# Patient Record
Sex: MALE | Race: WHITE | NOT HISPANIC OR LATINO | Employment: FULL TIME | URBAN - METROPOLITAN AREA
[De-identification: names, ages, dates, MRNs, and addresses within clinical notes are randomized per-mention and may not be internally consistent; named-entity substitution may affect disease eponyms.]

---

## 2024-02-09 ENCOUNTER — HOSPITAL ENCOUNTER (OUTPATIENT)
Dept: RADIOLOGY | Facility: CLINIC | Age: 64
Discharge: HOME | End: 2024-02-09
Payer: COMMERCIAL

## 2024-02-09 ENCOUNTER — OFFICE VISIT (OUTPATIENT)
Dept: PRIMARY CARE | Facility: CLINIC | Age: 64
End: 2024-02-09
Payer: COMMERCIAL

## 2024-02-09 VITALS
WEIGHT: 261 LBS | HEART RATE: 88 BPM | RESPIRATION RATE: 24 BRPM | SYSTOLIC BLOOD PRESSURE: 142 MMHG | TEMPERATURE: 98.2 F | BODY MASS INDEX: 35.4 KG/M2 | DIASTOLIC BLOOD PRESSURE: 92 MMHG | OXYGEN SATURATION: 95 %

## 2024-02-09 DIAGNOSIS — R09.89 ABNORMAL LUNG SOUNDS: ICD-10-CM

## 2024-02-09 DIAGNOSIS — R05.1 ACUTE COUGH: Primary | ICD-10-CM

## 2024-02-09 DIAGNOSIS — J01.90 ACUTE NON-RECURRENT SINUSITIS, UNSPECIFIED LOCATION: ICD-10-CM

## 2024-02-09 DIAGNOSIS — J00 ACUTE NASOPHARYNGITIS: ICD-10-CM

## 2024-02-09 PROBLEM — I10 HTN (HYPERTENSION): Status: ACTIVE | Noted: 2024-02-09

## 2024-02-09 PROBLEM — J45.909 ASTHMA (HHS-HCC): Status: ACTIVE | Noted: 2024-02-09

## 2024-02-09 PROBLEM — F32.A DEPRESSION: Status: ACTIVE | Noted: 2024-02-09

## 2024-02-09 PROBLEM — N41.9 PROSTATITIS: Status: ACTIVE | Noted: 2024-02-09

## 2024-02-09 PROBLEM — E78.5 HLD (HYPERLIPIDEMIA): Status: ACTIVE | Noted: 2024-02-09

## 2024-02-09 PROBLEM — G89.29 CHRONIC BACK PAIN: Status: ACTIVE | Noted: 2024-02-09

## 2024-02-09 PROBLEM — R35.0 INCREASED URINARY FREQUENCY: Status: ACTIVE | Noted: 2024-02-09

## 2024-02-09 PROBLEM — R10.32 LEFT LOWER QUADRANT ABDOMINAL PAIN: Status: ACTIVE | Noted: 2024-02-09

## 2024-02-09 PROBLEM — K21.9 GERD (GASTROESOPHAGEAL REFLUX DISEASE): Status: ACTIVE | Noted: 2024-02-09

## 2024-02-09 PROBLEM — N40.0 BPH (BENIGN PROSTATIC HYPERPLASIA): Status: ACTIVE | Noted: 2024-02-09

## 2024-02-09 PROBLEM — E27.9 ADRENAL NODULE: Status: ACTIVE | Noted: 2024-02-09

## 2024-02-09 PROBLEM — J30.2 SEASONAL ALLERGIC RHINITIS: Status: ACTIVE | Noted: 2024-02-09

## 2024-02-09 PROBLEM — K64.9 HEMORRHOID: Status: ACTIVE | Noted: 2024-02-09

## 2024-02-09 PROBLEM — M25.529 ARTHRALGIA OF UPPER ARM: Status: ACTIVE | Noted: 2017-07-31

## 2024-02-09 PROBLEM — M54.9 CHRONIC BACK PAIN: Status: ACTIVE | Noted: 2024-02-09

## 2024-02-09 PROBLEM — K59.00 CONSTIPATION: Status: ACTIVE | Noted: 2024-02-09

## 2024-02-09 PROBLEM — E55.9 VITAMIN D DEFICIENCY: Status: ACTIVE | Noted: 2024-02-09

## 2024-02-09 PROBLEM — K57.90 DIVERTICULOSIS: Status: ACTIVE | Noted: 2024-02-09

## 2024-02-09 PROBLEM — H91.90 HEARING LOSS: Status: ACTIVE | Noted: 2024-02-09

## 2024-02-09 PROBLEM — E27.8 ADRENAL NODULE (MULTI): Status: ACTIVE | Noted: 2024-02-09

## 2024-02-09 PROBLEM — I25.10 CAD (CORONARY ARTERY DISEASE): Status: ACTIVE | Noted: 2024-02-09

## 2024-02-09 LAB
POC RAPID INFLUENZA A: NEGATIVE
POC RAPID INFLUENZA B: NEGATIVE
POC SARS-COV-2 AG BINAX: NORMAL

## 2024-02-09 PROCEDURE — 3080F DIAST BP >= 90 MM HG: CPT | Performed by: NURSE PRACTITIONER

## 2024-02-09 PROCEDURE — 87811 SARS-COV-2 COVID19 W/OPTIC: CPT | Performed by: NURSE PRACTITIONER

## 2024-02-09 PROCEDURE — 87635 SARS-COV-2 COVID-19 AMP PRB: CPT

## 2024-02-09 PROCEDURE — 71046 X-RAY EXAM CHEST 2 VIEWS: CPT | Performed by: RADIOLOGY

## 2024-02-09 PROCEDURE — 71046 X-RAY EXAM CHEST 2 VIEWS: CPT

## 2024-02-09 PROCEDURE — 87804 INFLUENZA ASSAY W/OPTIC: CPT | Performed by: NURSE PRACTITIONER

## 2024-02-09 PROCEDURE — 99214 OFFICE O/P EST MOD 30 MIN: CPT | Performed by: NURSE PRACTITIONER

## 2024-02-09 PROCEDURE — 3077F SYST BP >= 140 MM HG: CPT | Performed by: NURSE PRACTITIONER

## 2024-02-09 PROCEDURE — 1036F TOBACCO NON-USER: CPT | Performed by: NURSE PRACTITIONER

## 2024-02-09 RX ORDER — PREDNISONE 50 MG/1
50 TABLET ORAL DAILY
Qty: 5 TABLET | Refills: 0 | Status: SHIPPED | OUTPATIENT
Start: 2024-02-09 | End: 2024-02-16 | Stop reason: ALTCHOICE

## 2024-02-09 RX ORDER — DOXYCYCLINE 100 MG/1
100 CAPSULE ORAL 2 TIMES DAILY
Qty: 14 CAPSULE | Refills: 0 | Status: SHIPPED | OUTPATIENT
Start: 2024-02-09 | End: 2024-02-16 | Stop reason: ALTCHOICE

## 2024-02-09 ASSESSMENT — ENCOUNTER SYMPTOMS
HEADACHES: 1
SHORTNESS OF BREATH: 1
WHEEZING: 1
COUGH: 1

## 2024-02-09 NOTE — PROGRESS NOTES
Subjective   Patient ID: Mayo Edmond is a 63 y.o. male who presents for Cough.  Cough  This is a new problem. The current episode started in the past 7 days. The problem has been unchanged. The problem occurs constantly. The cough is Productive of sputum. Associated symptoms include headaches, nasal congestion, postnasal drip, shortness of breath and wheezing. The symptoms are aggravated by lying down. He has tried OTC cough suppressant (Nyquil) for the symptoms. The treatment provided mild relief. His past medical history is significant for asthma, bronchitis and pneumonia.   Former smoker, quit approx 2008, approx 25 pack years.  Review of Systems   HENT:  Positive for postnasal drip.    Respiratory:  Positive for cough, shortness of breath and wheezing.    Neurological:  Positive for headaches.   Objective   BP (!) 142/92   Pulse 88   Temp 36.8 °C (98.2 °F) (Temporal)   Resp 24   Wt 118 kg (261 lb)   SpO2 95%   BMI 35.40 kg/m²   Physical Exam  Constitutional:       General: He is in acute distress.      Appearance: Normal appearance. He is ill-appearing. He is not toxic-appearing.   HENT:      Right Ear: Tympanic membrane normal.      Left Ear: Tympanic membrane normal.      Nose: Congestion and rhinorrhea present.      Mouth/Throat:      Pharynx: Posterior oropharyngeal erythema present.   Eyes:      Conjunctiva/sclera: Conjunctivae normal.   Cardiovascular:      Rate and Rhythm: Normal rate and regular rhythm.      Heart sounds: No murmur heard.  Pulmonary:      Effort: Pulmonary effort is normal.      Breath sounds: Wheezing (diffuse inspiratory and expiratory wheezes) and rales present.   Skin:     General: Skin is warm and dry.   Neurological:      Mental Status: He is alert and oriented to person, place, and time.     Assessment/Plan   1. Acute cough  POCT Influenza A/B manually resulted    POCT BinaxNOW Covid-19 Ag Card manually resulted      2. Abnormal lung sounds  predniSONE (Deltasone) 50 mg  tablet    doxycycline (Vibramycin) 100 mg capsule    XR chest 2 views      3. Acute non-recurrent sinusitis, unspecified location        Rapid COVID and flu negative. Backup COVID sent.  Concern for possible pneumonia.  Increase oral fluids/water, at least eight 8-oz glasses/day.  Get plenty of rest.  Cepacol lozenges and/or Chloraseptic spray PRN for sore throat. Salt water gargle or broth for comfort.  Alternate Tylenol/Ibuprofen PRN for body aches and/or fever  Saline nasal spray PRN for rhinitis.  Guaifenessin/Guaifenissin DM PRN for cough  Flonase nasal spray.

## 2024-02-10 LAB — SARS-COV-2 RNA RESP QL NAA+PROBE: NOT DETECTED

## 2024-02-10 NOTE — RESULT ENCOUNTER NOTE
Chest x-ray and COVID-19 testing were both negative. Pt should continue the doxycycline and medrol dose pack until they are complete. F/U with Dr. Chong if symptoms not improved after treatment complete.

## 2024-02-16 ENCOUNTER — OFFICE VISIT (OUTPATIENT)
Dept: PRIMARY CARE | Facility: CLINIC | Age: 64
End: 2024-02-16
Payer: COMMERCIAL

## 2024-02-16 VITALS
TEMPERATURE: 98 F | WEIGHT: 262 LBS | OXYGEN SATURATION: 95 % | HEART RATE: 85 BPM | RESPIRATION RATE: 18 BRPM | DIASTOLIC BLOOD PRESSURE: 80 MMHG | SYSTOLIC BLOOD PRESSURE: 144 MMHG | BODY MASS INDEX: 35.53 KG/M2

## 2024-02-16 DIAGNOSIS — J01.90 ACUTE NON-RECURRENT SINUSITIS, UNSPECIFIED LOCATION: Primary | ICD-10-CM

## 2024-02-16 PROCEDURE — 3077F SYST BP >= 140 MM HG: CPT | Performed by: NURSE PRACTITIONER

## 2024-02-16 PROCEDURE — 3079F DIAST BP 80-89 MM HG: CPT | Performed by: NURSE PRACTITIONER

## 2024-02-16 PROCEDURE — 99214 OFFICE O/P EST MOD 30 MIN: CPT | Performed by: NURSE PRACTITIONER

## 2024-02-16 PROCEDURE — 1036F TOBACCO NON-USER: CPT | Performed by: NURSE PRACTITIONER

## 2024-02-16 RX ORDER — DOXYCYCLINE 100 MG/1
100 CAPSULE ORAL 2 TIMES DAILY
Qty: 14 CAPSULE | Refills: 0 | Status: SHIPPED | OUTPATIENT
Start: 2024-02-16 | End: 2024-02-23

## 2024-02-16 RX ORDER — TRIAMCINOLONE ACETONIDE 55 UG/1
1 SPRAY, METERED NASAL 2 TIMES DAILY
Qty: 16.5 G | Refills: 0 | Status: SHIPPED | OUTPATIENT
Start: 2024-02-16 | End: 2024-02-23

## 2024-02-16 ASSESSMENT — ENCOUNTER SYMPTOMS
COUGH: 1
SINUS PRESSURE: 1
SHORTNESS OF BREATH: 1

## 2024-02-16 NOTE — PROGRESS NOTES
Subjective   Patient ID: Mayo Edmond is a 63 y.o. male who presents for Sinusitis. He was treated with a 7-day course of doxycycline and 5-day burst of prednisone 50 mg. He states his cough is bit looser, but he still feels terrible.   Sinusitis  Episode onset: 3 weeks. Associated symptoms include coughing, shortness of breath and sinus pressure. Treatments tried: Doxycyline, prednisone.    Review of Systems   HENT:  Positive for sinus pressure.    Respiratory:  Positive for cough and shortness of breath.    Objective   /80   Pulse 85   Temp 36.7 °C (98 °F)   Resp 18   Wt 119 kg (262 lb)   SpO2 95%   BMI 35.53 kg/m²   Physical Exam  Constitutional:       General: He is not in acute distress.     Appearance: Normal appearance. He is ill-appearing. He is not toxic-appearing or diaphoretic.   HENT:      Right Ear: Tympanic membrane normal.      Left Ear: Tympanic membrane normal.      Nose: Congestion present. No rhinorrhea.      Mouth/Throat:      Mouth: Mucous membranes are moist.      Pharynx: Posterior oropharyngeal erythema present.   Eyes:      Conjunctiva/sclera: Conjunctivae normal.   Cardiovascular:      Rate and Rhythm: Normal rate and regular rhythm.      Heart sounds: No murmur heard.  Pulmonary:      Effort: Pulmonary effort is normal.      Breath sounds: Wheezing (few posterior expiratory wheezes. Much improved.) present.   Skin:     General: Skin is warm and dry.   Neurological:      General: No focal deficit present.      Mental Status: He is alert and oriented to person, place, and time.   Psychiatric:         Mood and Affect: Mood normal.     Assessment/Plan   1. Acute non-recurrent sinusitis, unspecified location  doxycycline (Vibramycin) 100 mg capsule    triamcinolone (Nasacort) 55 mcg nasal inhaler    lungs clearing, but still with significant sinusitis and moist cough. Will continue doxy for another week and add nasocort nasal spray.        Increase oral fluids/water, at least  eight 8-oz glasses/day.  Get plenty of rest.  Cepacol lozenges and/or Chloraseptic spray PRN for sore throat. Salt water gargle or broth for comfort.  Alternate Tylenol/Ibuprofen PRN for body aches and/or fever  Saline nasal spray PRN for rhinitis.  Guaifenessin/Guaifenissin DM PRN for cough  Flonase nasal spray.    Follow-up if not improving over the next week.

## 2024-12-06 ENCOUNTER — OFFICE VISIT (OUTPATIENT)
Dept: PRIMARY CARE | Facility: CLINIC | Age: 64
End: 2024-12-06
Payer: COMMERCIAL

## 2024-12-06 VITALS
WEIGHT: 250 LBS | SYSTOLIC BLOOD PRESSURE: 122 MMHG | OXYGEN SATURATION: 96 % | BODY MASS INDEX: 33.91 KG/M2 | HEART RATE: 85 BPM | DIASTOLIC BLOOD PRESSURE: 82 MMHG | TEMPERATURE: 98 F | RESPIRATION RATE: 20 BRPM

## 2024-12-06 DIAGNOSIS — H57.89 IRRITATION OF BOTH EYES: Primary | ICD-10-CM

## 2024-12-06 PROCEDURE — 99213 OFFICE O/P EST LOW 20 MIN: CPT | Performed by: NURSE PRACTITIONER

## 2024-12-06 PROCEDURE — 1036F TOBACCO NON-USER: CPT | Performed by: NURSE PRACTITIONER

## 2024-12-06 PROCEDURE — 3074F SYST BP LT 130 MM HG: CPT | Performed by: NURSE PRACTITIONER

## 2024-12-06 PROCEDURE — 3079F DIAST BP 80-89 MM HG: CPT | Performed by: NURSE PRACTITIONER

## 2024-12-06 RX ORDER — ERYTHROMYCIN 5 MG/G
OINTMENT OPHTHALMIC 4 TIMES DAILY
Qty: 3.5 G | Refills: 0 | Status: SHIPPED | OUTPATIENT
Start: 2024-12-06 | End: 2024-12-13

## 2024-12-06 ASSESSMENT — ENCOUNTER SYMPTOMS
SLEEP DISTURBANCE: 0
COUGH: 0
EYE PAIN: 0
NAUSEA: 0
FEVER: 0
CHILLS: 0
PHOTOPHOBIA: 1
DIARRHEA: 0
VOMITING: 0
BLURRED VISION: 1
ACTIVITY CHANGE: 0
EYE ITCHING: 0
EYE REDNESS: 1
APPETITE CHANGE: 0
EYE DISCHARGE: 0

## 2024-12-06 NOTE — PROGRESS NOTES
Subjective   Patient ID: Mayo Edmond is a 64 y.o. male who presents for Eye Problem (Red eyes watery/Sunlight is bothering him/Looking at phone for a few minutes bothers him/).    Bilateral eyes discomfort xseveral weeks  Watery eyes  Eyes get blurry; worse when looking at this phone  Light bothers some of the time  No drainage    Not up to date on vision      Eye Problem   Both eyes are affected. This is a chronic problem. Episode onset: 2-3 weeks getting worse. The problem occurs constantly. The problem has been gradually worsening. The pain is mild. Associated symptoms include blurred vision, eye redness and photophobia. Pertinent negatives include no eye discharge, fever, itching, nausea or vomiting. He has tried eye drops for the symptoms. The treatment provided no relief.        Review of Systems   Constitutional:  Negative for activity change, appetite change, chills and fever.   HENT:  Negative for congestion.    Eyes:  Positive for blurred vision, photophobia and redness. Negative for pain, discharge and itching.   Respiratory:  Negative for cough.    Gastrointestinal:  Negative for diarrhea, nausea and vomiting.   Psychiatric/Behavioral:  Negative for sleep disturbance.        Objective   /82   Pulse 85   Temp 36.7 °C (98 °F)   Resp 20   Wt 113 kg (250 lb)   SpO2 96%   BMI 33.91 kg/m²     Physical Exam  Vitals reviewed.   Constitutional:       General: He is not in acute distress.     Appearance: Normal appearance. He is not ill-appearing or toxic-appearing.   HENT:      Right Ear: Tympanic membrane, ear canal and external ear normal.      Left Ear: Tympanic membrane, ear canal and external ear normal.      Nose: Nose normal.      Mouth/Throat:      Mouth: Mucous membranes are moist.   Eyes:      General:         Right eye: No discharge.         Left eye: No discharge.      Extraocular Movements: Extraocular movements intact.      Conjunctiva/sclera:      Right eye: Right conjunctiva is  injected. No exudate.     Left eye: Left conjunctiva is injected. No exudate.     Pupils: Pupils are equal, round, and reactive to light.        Comments: R eye has swollen/inflamed caruncle   Cardiovascular:      Rate and Rhythm: Normal rate and regular rhythm.      Pulses: Normal pulses.      Heart sounds: Normal heart sounds.   Pulmonary:      Effort: Pulmonary effort is normal.      Breath sounds: Normal breath sounds.   Musculoskeletal:      Cervical back: Normal range of motion and neck supple.   Skin:     General: Skin is warm and dry.      Capillary Refill: Capillary refill takes less than 2 seconds.   Neurological:      General: No focal deficit present.      Mental Status: He is alert and oriented to person, place, and time.   Psychiatric:         Mood and Affect: Mood normal.         Behavior: Behavior normal.         Assessment/Plan   Diagnoses and all orders for this visit:  Irritation of both eyes  -     erythromycin (Romycin) 5 mg/gram (0.5 %) ophthalmic ointment; Apply to both eyes 4 times a day for 7 days. Apply Amount per Dose: 0.5 inch (~1 cm) per dose.    Eye ointment given for inflamed caruncle. Use as directed  Follow up with [facility] as scheduled. Will assist in scheduling  ER for any worsening of symptoms

## 2025-01-09 ENCOUNTER — HOSPITAL ENCOUNTER (OUTPATIENT)
Dept: RADIOLOGY | Facility: CLINIC | Age: 65
Discharge: HOME | End: 2025-01-09
Payer: COMMERCIAL

## 2025-01-09 ENCOUNTER — OFFICE VISIT (OUTPATIENT)
Dept: PRIMARY CARE | Facility: CLINIC | Age: 65
End: 2025-01-09
Payer: COMMERCIAL

## 2025-01-09 VITALS
HEART RATE: 79 BPM | BODY MASS INDEX: 33.63 KG/M2 | RESPIRATION RATE: 20 BRPM | WEIGHT: 248 LBS | OXYGEN SATURATION: 99 % | DIASTOLIC BLOOD PRESSURE: 86 MMHG | TEMPERATURE: 97.5 F | SYSTOLIC BLOOD PRESSURE: 130 MMHG

## 2025-01-09 DIAGNOSIS — R10.9 FLANK PAIN: ICD-10-CM

## 2025-01-09 DIAGNOSIS — R10.32 LEFT LOWER QUADRANT ABDOMINAL PAIN: ICD-10-CM

## 2025-01-09 DIAGNOSIS — R10.32 LEFT LOWER QUADRANT ABDOMINAL PAIN: Primary | ICD-10-CM

## 2025-01-09 PROBLEM — I10 HYPERTENSION: Status: ACTIVE | Noted: 2022-11-30

## 2025-01-09 PROBLEM — K57.90 DIVERTICULAR DISEASE: Status: ACTIVE | Noted: 2022-09-08

## 2025-01-09 LAB
POC APPEARANCE, URINE: CLEAR
POC BILIRUBIN, URINE: NEGATIVE
POC BLOOD, URINE: NEGATIVE
POC COLOR, URINE: ABNORMAL
POC GLUCOSE, URINE: NEGATIVE MG/DL
POC KETONES, URINE: NEGATIVE MG/DL
POC LEUKOCYTES, URINE: NEGATIVE
POC NITRITE,URINE: NEGATIVE
POC PH, URINE: 6.5 PH
POC PROTEIN, URINE: NEGATIVE MG/DL
POC SPECIFIC GRAVITY, URINE: 1.01
POC UROBILINOGEN, URINE: 0.2 EU/DL

## 2025-01-09 PROCEDURE — 74176 CT ABD & PELVIS W/O CONTRAST: CPT | Performed by: RADIOLOGY

## 2025-01-09 PROCEDURE — 3079F DIAST BP 80-89 MM HG: CPT | Performed by: FAMILY MEDICINE

## 2025-01-09 PROCEDURE — 81002 URINALYSIS NONAUTO W/O SCOPE: CPT | Performed by: FAMILY MEDICINE

## 2025-01-09 PROCEDURE — 74176 CT ABD & PELVIS W/O CONTRAST: CPT

## 2025-01-09 PROCEDURE — 1036F TOBACCO NON-USER: CPT | Performed by: FAMILY MEDICINE

## 2025-01-09 PROCEDURE — 3075F SYST BP GE 130 - 139MM HG: CPT | Performed by: FAMILY MEDICINE

## 2025-01-09 PROCEDURE — 99214 OFFICE O/P EST MOD 30 MIN: CPT | Performed by: FAMILY MEDICINE

## 2025-01-09 RX ORDER — METRONIDAZOLE 500 MG/1
500 TABLET ORAL 3 TIMES DAILY
Qty: 30 TABLET | Refills: 0 | Status: SHIPPED | OUTPATIENT
Start: 2025-01-09 | End: 2025-01-19

## 2025-01-09 RX ORDER — CIPROFLOXACIN 500 MG/1
500 TABLET ORAL 2 TIMES DAILY
Qty: 20 TABLET | Refills: 0 | Status: SHIPPED | OUTPATIENT
Start: 2025-01-09 | End: 2025-01-19

## 2025-01-09 ASSESSMENT — ENCOUNTER SYMPTOMS
ABDOMINAL PAIN: 1
RHINORRHEA: 0
VOMITING: 0
HEMATURIA: 0
DIFFICULTY URINATING: 0
BLOOD IN STOOL: 1
FEVER: 0
SORE THROAT: 0
WHEEZING: 0
FATIGUE: 1
COUGH: 0
SHORTNESS OF BREATH: 0
DIARRHEA: 1
DYSURIA: 0
CONSTIPATION: 1
WEIGHT LOSS: 1
NAUSEA: 0

## 2025-01-09 NOTE — PROGRESS NOTES
Subjective   Patient ID: Mayo Edmond is a 64 y.o. male who presents for Abdominal Pain (Back pain/SX: started [place]/HX: diverticulitis/).    Abdominal Pain  This is a new problem. Episode onset: few weeks. The onset quality is gradual. The problem occurs constantly. The problem has been gradually worsening. The pain is located in the LLQ and left flank. The pain is at a severity of 10/10 (varies). The quality of the pain is sharp and cramping. Associated symptoms include constipation, diarrhea and weight loss. Pertinent negatives include no dysuria, fever, hematuria, nausea or vomiting. Associated symptoms comments: Rectal bleeding-has Hemorrhoids    . Treatments tried: soup diet. The treatment provided no relief.        Review of Systems   Constitutional:  Positive for fatigue and weight loss. Negative for fever.        Symptoms x few weeks,    HENT:  Negative for congestion, ear pain, rhinorrhea and sore throat.    Respiratory:  Negative for cough, shortness of breath and wheezing.    Cardiovascular:  Negative for chest pain.   Gastrointestinal:  Positive for abdominal pain, blood in stool, constipation and diarrhea. Negative for nausea and vomiting.        Pain is LLQ and left flank pain  Pain is sharp , intermittent, worse with BM, improved with motrin  Has had uti, prostatitis, hx of diverticulitis, no hx of kidney stones.   Genitourinary:  Negative for difficulty urinating, dysuria, hematuria, scrotal swelling and testicular pain.        Pt with hx of prostatitis       Objective   /86   Pulse 79   Temp 36.4 °C (97.5 °F)   Resp 20   Wt 112 kg (248 lb)   SpO2 99%   BMI 33.63 kg/m²     Physical Exam  Vitals and nursing note reviewed.   Constitutional:       General: He is not in acute distress.     Appearance: Normal appearance.   HENT:      Head: Normocephalic and atraumatic.   Cardiovascular:      Rate and Rhythm: Normal rate and regular rhythm.      Heart sounds: Normal heart sounds.    Pulmonary:      Effort: Pulmonary effort is normal.      Breath sounds: Normal breath sounds.   Abdominal:      General: Bowel sounds are normal.      Palpations: Abdomen is soft. There is no mass.      Tenderness: There is abdominal tenderness. There is no right CVA tenderness, left CVA tenderness, guarding or rebound.      Comments: LLQ pain to palp, no cvat to palp  No peritoneal signs   Neurological:      Mental Status: He is alert.         Assessment/Plan   Problem List Items Addressed This Visit             ICD-10-CM    Left lower quadrant abdominal pain - Primary R10.32     Pt with likely diverticulitis  Take atbx as directed  Get ct abd/pelvis stat today  Clear fld diet, rest ,   Will call pt with any abn result and make further recc's as needed based on results  F/up after testing and tx, may need to f/up with GI  Go to ER if worsening symptoms         Relevant Medications    ciprofloxacin (Cipro) 500 mg tablet    metroNIDAZOLE (Flagyl) 500 mg tablet    Other Relevant Orders    CT abdomen pelvis wo IV contrast    Flank pain R10.9     Will check ct abd/pelvis  Pt will be started on atbx  Will call pt if any abn results         Relevant Orders    POCT UA (nonautomated) manually resulted (Completed)    CT abdomen pelvis wo IV contrast

## 2025-01-09 NOTE — ASSESSMENT & PLAN NOTE
Pt with likely diverticulitis  Take atbx as directed  Get ct abd/pelvis stat today  Clear fld diet, rest ,   Will call pt with any abn result and make further recc's as needed based on results  F/up after testing and tx, may need to f/up with GI  Go to ER if worsening symptoms

## 2025-01-10 ENCOUNTER — TELEPHONE (OUTPATIENT)
Dept: PRIMARY CARE | Facility: CLINIC | Age: 65
End: 2025-01-10
Payer: COMMERCIAL

## 2025-01-10 NOTE — TELEPHONE ENCOUNTER
----- Message from Serenity Chong sent at 1/9/2025  3:18 PM EST -----  Call pt ,  stat ct abd/pelvis shows early diverticulitis without abscess or perf  Pt to take atbx as rx'd. Will need f/up appt with me in 2 wk  He will need to f/up with GI also. We can arrange that at f/up appt  Small adrenal adenoma redemonstrated on this exam, patchy fatty liver, small not incarcerated umbilical hernia containing intra abd fat, may need surg referral

## 2025-01-16 ENCOUNTER — HOSPITAL ENCOUNTER (EMERGENCY)
Facility: HOSPITAL | Age: 65
Discharge: HOME | End: 2025-01-16
Payer: COMMERCIAL

## 2025-01-16 ENCOUNTER — APPOINTMENT (OUTPATIENT)
Dept: RADIOLOGY | Facility: HOSPITAL | Age: 65
End: 2025-01-16
Payer: COMMERCIAL

## 2025-01-16 VITALS
HEIGHT: 72 IN | HEART RATE: 74 BPM | SYSTOLIC BLOOD PRESSURE: 121 MMHG | BODY MASS INDEX: 33.59 KG/M2 | OXYGEN SATURATION: 97 % | DIASTOLIC BLOOD PRESSURE: 78 MMHG | TEMPERATURE: 97.9 F | WEIGHT: 248 LBS | RESPIRATION RATE: 17 BRPM

## 2025-01-16 DIAGNOSIS — R10.32 LEFT LOWER QUADRANT ABDOMINAL PAIN: ICD-10-CM

## 2025-01-16 DIAGNOSIS — K57.92 DIVERTICULITIS: Primary | ICD-10-CM

## 2025-01-16 LAB
ALBUMIN SERPL BCP-MCNC: 4.3 G/DL (ref 3.4–5)
ALP SERPL-CCNC: 39 U/L (ref 33–136)
ALT SERPL W P-5'-P-CCNC: 45 U/L (ref 10–52)
ANION GAP SERPL CALC-SCNC: 17 MMOL/L (ref 10–20)
APPEARANCE UR: CLEAR
AST SERPL W P-5'-P-CCNC: 34 U/L (ref 9–39)
BASOPHILS # BLD AUTO: 0.06 X10*3/UL (ref 0–0.1)
BASOPHILS NFR BLD AUTO: 1 %
BILIRUB SERPL-MCNC: 0.6 MG/DL (ref 0–1.2)
BILIRUB UR STRIP.AUTO-MCNC: NEGATIVE MG/DL
BUN SERPL-MCNC: 10 MG/DL (ref 6–23)
CALCIUM SERPL-MCNC: 9.5 MG/DL (ref 8.6–10.3)
CHLORIDE SERPL-SCNC: 100 MMOL/L (ref 98–107)
CO2 SERPL-SCNC: 22 MMOL/L (ref 21–32)
COLOR UR: YELLOW
CREAT SERPL-MCNC: 0.84 MG/DL (ref 0.5–1.3)
EGFRCR SERPLBLD CKD-EPI 2021: >90 ML/MIN/1.73M*2
EOSINOPHIL # BLD AUTO: 0.08 X10*3/UL (ref 0–0.7)
EOSINOPHIL NFR BLD AUTO: 1.3 %
ERYTHROCYTE [DISTWIDTH] IN BLOOD BY AUTOMATED COUNT: 12 % (ref 11.5–14.5)
GLUCOSE SERPL-MCNC: 75 MG/DL (ref 74–99)
GLUCOSE UR STRIP.AUTO-MCNC: NORMAL MG/DL
HCT VFR BLD AUTO: 46.2 % (ref 41–52)
HGB BLD-MCNC: 15.4 G/DL (ref 13.5–17.5)
HOLD SPECIMEN: NORMAL
HYALINE CASTS #/AREA URNS AUTO: ABNORMAL /LPF
IMM GRANULOCYTES # BLD AUTO: 0.02 X10*3/UL (ref 0–0.7)
IMM GRANULOCYTES NFR BLD AUTO: 0.3 % (ref 0–0.9)
KETONES UR STRIP.AUTO-MCNC: ABNORMAL MG/DL
LACTATE SERPL-SCNC: 1 MMOL/L (ref 0.4–2)
LEUKOCYTE ESTERASE UR QL STRIP.AUTO: ABNORMAL
LIPASE SERPL-CCNC: 28 U/L (ref 9–82)
LYMPHOCYTES # BLD AUTO: 0.99 X10*3/UL (ref 1.2–4.8)
LYMPHOCYTES NFR BLD AUTO: 15.8 %
MCH RBC QN AUTO: 29.3 PG (ref 26–34)
MCHC RBC AUTO-ENTMCNC: 33.3 G/DL (ref 32–36)
MCV RBC AUTO: 88 FL (ref 80–100)
MONOCYTES # BLD AUTO: 0.59 X10*3/UL (ref 0.1–1)
MONOCYTES NFR BLD AUTO: 9.4 %
MUCOUS THREADS #/AREA URNS AUTO: ABNORMAL /LPF
NEUTROPHILS # BLD AUTO: 4.52 X10*3/UL (ref 1.2–7.7)
NEUTROPHILS NFR BLD AUTO: 72.2 %
NITRITE UR QL STRIP.AUTO: NEGATIVE
NRBC BLD-RTO: 0 /100 WBCS (ref 0–0)
PH UR STRIP.AUTO: 5 [PH]
PLATELET # BLD AUTO: 261 X10*3/UL (ref 150–450)
POTASSIUM SERPL-SCNC: 4 MMOL/L (ref 3.5–5.3)
PROT SERPL-MCNC: 7.2 G/DL (ref 6.4–8.2)
PROT UR STRIP.AUTO-MCNC: NEGATIVE MG/DL
RBC # BLD AUTO: 5.26 X10*6/UL (ref 4.5–5.9)
RBC # UR STRIP.AUTO: NEGATIVE /UL
RBC #/AREA URNS AUTO: ABNORMAL /HPF
SODIUM SERPL-SCNC: 135 MMOL/L (ref 136–145)
SP GR UR STRIP.AUTO: 1.02
UROBILINOGEN UR STRIP.AUTO-MCNC: NORMAL MG/DL
WBC # BLD AUTO: 6.3 X10*3/UL (ref 4.4–11.3)
WBC #/AREA URNS AUTO: ABNORMAL /HPF

## 2025-01-16 PROCEDURE — 96374 THER/PROPH/DIAG INJ IV PUSH: CPT | Mod: 59

## 2025-01-16 PROCEDURE — 2500000004 HC RX 250 GENERAL PHARMACY W/ HCPCS (ALT 636 FOR OP/ED): Performed by: NURSE PRACTITIONER

## 2025-01-16 PROCEDURE — 81001 URINALYSIS AUTO W/SCOPE: CPT | Performed by: NURSE PRACTITIONER

## 2025-01-16 PROCEDURE — 74177 CT ABD & PELVIS W/CONTRAST: CPT | Mod: FOREIGN READ | Performed by: RADIOLOGY

## 2025-01-16 PROCEDURE — 96361 HYDRATE IV INFUSION ADD-ON: CPT

## 2025-01-16 PROCEDURE — 83605 ASSAY OF LACTIC ACID: CPT | Performed by: NURSE PRACTITIONER

## 2025-01-16 PROCEDURE — 96375 TX/PRO/DX INJ NEW DRUG ADDON: CPT

## 2025-01-16 PROCEDURE — 80053 COMPREHEN METABOLIC PANEL: CPT | Performed by: NURSE PRACTITIONER

## 2025-01-16 PROCEDURE — 99285 EMERGENCY DEPT VISIT HI MDM: CPT | Mod: 25

## 2025-01-16 PROCEDURE — 99285 EMERGENCY DEPT VISIT HI MDM: CPT | Performed by: NURSE PRACTITIONER

## 2025-01-16 PROCEDURE — 74177 CT ABD & PELVIS W/CONTRAST: CPT

## 2025-01-16 PROCEDURE — 87086 URINE CULTURE/COLONY COUNT: CPT | Mod: STJLAB | Performed by: NURSE PRACTITIONER

## 2025-01-16 PROCEDURE — 36415 COLL VENOUS BLD VENIPUNCTURE: CPT | Performed by: NURSE PRACTITIONER

## 2025-01-16 PROCEDURE — 83690 ASSAY OF LIPASE: CPT | Performed by: NURSE PRACTITIONER

## 2025-01-16 PROCEDURE — 85025 COMPLETE CBC W/AUTO DIFF WBC: CPT | Performed by: NURSE PRACTITIONER

## 2025-01-16 PROCEDURE — 2550000001 HC RX 255 CONTRASTS: Performed by: NURSE PRACTITIONER

## 2025-01-16 RX ORDER — KETOROLAC TROMETHAMINE 15 MG/ML
15 INJECTION, SOLUTION INTRAMUSCULAR; INTRAVENOUS ONCE
Status: COMPLETED | OUTPATIENT
Start: 2025-01-16 | End: 2025-01-16

## 2025-01-16 RX ORDER — DICYCLOMINE HYDROCHLORIDE 20 MG/1
20 TABLET ORAL 3 TIMES DAILY PRN
Qty: 120 TABLET | Refills: 0 | Status: SHIPPED | OUTPATIENT
Start: 2025-01-16 | End: 2025-01-22 | Stop reason: ALTCHOICE

## 2025-01-16 RX ORDER — MORPHINE SULFATE 4 MG/ML
4 INJECTION, SOLUTION INTRAMUSCULAR; INTRAVENOUS ONCE
Status: COMPLETED | OUTPATIENT
Start: 2025-01-16 | End: 2025-01-16

## 2025-01-16 RX ORDER — NAPROXEN 500 MG/1
500 TABLET ORAL 2 TIMES DAILY PRN
Qty: 30 TABLET | Refills: 0 | Status: SHIPPED | OUTPATIENT
Start: 2025-01-16 | End: 2025-01-22 | Stop reason: ALTCHOICE

## 2025-01-16 RX ORDER — SODIUM CHLORIDE 9 MG/ML
125 INJECTION, SOLUTION INTRAVENOUS CONTINUOUS
Status: DISCONTINUED | OUTPATIENT
Start: 2025-01-16 | End: 2025-01-16 | Stop reason: HOSPADM

## 2025-01-16 RX ORDER — ONDANSETRON HYDROCHLORIDE 2 MG/ML
4 INJECTION, SOLUTION INTRAVENOUS ONCE
Status: COMPLETED | OUTPATIENT
Start: 2025-01-16 | End: 2025-01-16

## 2025-01-16 RX ORDER — ONDANSETRON 4 MG/1
4 TABLET, ORALLY DISINTEGRATING ORAL EVERY 8 HOURS PRN
Qty: 15 TABLET | Refills: 0 | Status: SHIPPED | OUTPATIENT
Start: 2025-01-16 | End: 2025-01-22 | Stop reason: ALTCHOICE

## 2025-01-16 RX ADMIN — ONDANSETRON 4 MG: 2 INJECTION INTRAMUSCULAR; INTRAVENOUS at 11:19

## 2025-01-16 RX ADMIN — MORPHINE SULFATE 4 MG: 4 INJECTION, SOLUTION INTRAMUSCULAR; INTRAVENOUS at 11:15

## 2025-01-16 RX ADMIN — KETOROLAC TROMETHAMINE 15 MG: 15 INJECTION, SOLUTION INTRAMUSCULAR; INTRAVENOUS at 12:49

## 2025-01-16 RX ADMIN — IOHEXOL 90 ML: 350 INJECTION, SOLUTION INTRAVENOUS at 12:30

## 2025-01-16 RX ADMIN — SODIUM CHLORIDE 125 ML/HR: 9 INJECTION, SOLUTION INTRAVENOUS at 11:19

## 2025-01-16 ASSESSMENT — COLUMBIA-SUICIDE SEVERITY RATING SCALE - C-SSRS
1. IN THE PAST MONTH, HAVE YOU WISHED YOU WERE DEAD OR WISHED YOU COULD GO TO SLEEP AND NOT WAKE UP?: NO
2. HAVE YOU ACTUALLY HAD ANY THOUGHTS OF KILLING YOURSELF?: NO
6. HAVE YOU EVER DONE ANYTHING, STARTED TO DO ANYTHING, OR PREPARED TO DO ANYTHING TO END YOUR LIFE?: NO

## 2025-01-16 ASSESSMENT — PAIN SCALES - GENERAL
PAINLEVEL_OUTOF10: 10 - WORST POSSIBLE PAIN
PAINLEVEL_OUTOF10: 4
PAINLEVEL_OUTOF10: 8
PAINLEVEL_OUTOF10: 1
PAINLEVEL_OUTOF10: 1

## 2025-01-16 ASSESSMENT — PAIN DESCRIPTION - FREQUENCY: FREQUENCY: CONSTANT/CONTINUOUS

## 2025-01-16 ASSESSMENT — PAIN DESCRIPTION - ORIENTATION
ORIENTATION: LOWER;RIGHT;LEFT
ORIENTATION: LOWER;MID

## 2025-01-16 ASSESSMENT — LIFESTYLE VARIABLES
HAVE PEOPLE ANNOYED YOU BY CRITICIZING YOUR DRINKING: NO
TOTAL SCORE: 0
EVER FELT BAD OR GUILTY ABOUT YOUR DRINKING: NO
EVER HAD A DRINK FIRST THING IN THE MORNING TO STEADY YOUR NERVES TO GET RID OF A HANGOVER: NO
HAVE YOU EVER FELT YOU SHOULD CUT DOWN ON YOUR DRINKING: NO

## 2025-01-16 ASSESSMENT — PAIN DESCRIPTION - LOCATION
LOCATION: ABDOMEN

## 2025-01-16 ASSESSMENT — PAIN DESCRIPTION - PAIN TYPE: TYPE: ACUTE PAIN

## 2025-01-16 ASSESSMENT — PAIN DESCRIPTION - PROGRESSION: CLINICAL_PROGRESSION: NOT CHANGED

## 2025-01-16 ASSESSMENT — PAIN - FUNCTIONAL ASSESSMENT
PAIN_FUNCTIONAL_ASSESSMENT: 0-10
PAIN_FUNCTIONAL_ASSESSMENT: 0-10

## 2025-01-16 NOTE — ED PROVIDER NOTES
"Emergency Department Encounter  [facility] EMERGENCY MEDICINE    Patient: Mayo Edmond  MRN: [id number]  : 1960  Date of Evaluation: 2025  ED Provider: MORENO Arita      Chief Complaint       Chief Complaint   Patient presents with    Abdominal Pain     Patient states he has an \"infected diverticulitis\" and I have a \"prostate infection, and I'm on ATB for that, but I'm not getting better, I'm getting worse.\"     [place]    (Location/Symptom, Timing/Onset, Context/Setting, Quality, Duration, Modifying Factors, Severity) Note limiting factors.   Limitations to History: none  Historian: self  Records reviewed: EMR inpatient and outpatient notes, Care Everywhere      Mayo Edmond is a 64 y.o. male who presents to the emergency department complaining of decreased appetite, worsening left lower quadrant pain, diagnosed with early diverticulitis via CAT scan on , has been on ciprofloxacin and Flagyl.  Symptoms have been getting progressively worse with pain, nausea.  Sent in by his doctor for reevaluation.  Denies any chest pain, shortness of breath, headaches, visual changes, syncope.    ROS:     Review of Systems  14 systems reviewed and otherwise acutely negative except as in the [place].          Past History     Past Medical History:   Diagnosis Date    Personal history of other diseases of the musculoskeletal system and connective tissue     History of joint pain    Personal history of other diseases of the nervous system and sense organs 2022    History of drainage from eye    Personal history of other infectious and parasitic diseases 2021    History of tinea corporis    Unspecified injury of unspecified wrist, hand and finger(s), initial encounter 2018    Wrist injury     Past Surgical History:   Procedure Laterality Date    OTHER SURGICAL HISTORY  2021    Knee arthroscopy    OTHER SURGICAL HISTORY  2021    Tonsillectomy with " adenoidectomy     Social History     Socioeconomic History    Marital status:    Tobacco Use    Smoking status: Never    Smokeless tobacco: Never   Vaping Use    Vaping status: Never Used   Substance and Sexual Activity    Alcohol use: Not Currently    Drug use: Never    Sexual activity: Defer       Medications/Allergies     Previous Medications    CIPROFLOXACIN (CIPRO) 500 MG TABLET    Take 1 tablet (500 mg) by mouth 2 times a day for 10 days.    METRONIDAZOLE (FLAGYL) 500 MG TABLET    Take 1 tablet (500 mg) by mouth 3 times a day for 10 days.     Allergies   Allergen Reactions    Diltiazem Unknown and Itching        Physical Exam       ED Triage Vitals [01/16/25 1031]   Temperature Heart Rate Respirations BP   36.6 °C (97.9 °F) 91 18 (!) 135/91      Pulse Ox Temp Source Heart Rate Source Patient Position   99 % Temporal Monitor Sitting      BP Location FiO2 (%)     Right arm --         Physical Exam    GENERAL:  The patient appears nourished and normally developed. Vital signs as documented.     HEENT:  Head normocephalic, atraumatic, EOMs intact, PERRLA, Mucous membranes moist. Nares patent without copious rhinorrhea.  No lymphadenopathy.    PULMONARY:  Lungs are clear to auscultation, without any respiratory distress. Able to speak full sentences, no accessory muscle use    CARDIAC:   Normal rate. No murmurs, rubs or gallops    ABDOMEN:  Soft, non distended, tender on palpation to left lower quadrant, BS positive x 4 quadrants, No rebound or guarding, no peritoneal signs, no CVA tenderness, no masses or organomegaly    MUSCULOSKELETAL:   Able to ambulate, Non edematous, with no obvious deformities. Pulses intact distal    SKIN:   Good color, with no significant rashes.  No pallor.    NEURO:  No obvious neurological deficits, normal sensation and strength bilaterally.  Able to follow commands, NIH 0, CN 2-12 intact.        Diagnostics   Labs:  Labs Reviewed   CBC WITH AUTO DIFFERENTIAL - Abnormal        Result Value    WBC 6.3      nRBC 0.0      RBC 5.26      Hemoglobin 15.4      Hematocrit 46.2      MCV 88      MCH 29.3      MCHC 33.3      RDW 12.0      Platelets 261      Neutrophils % 72.2      Immature Granulocytes %, Automated 0.3      Lymphocytes % 15.8      Monocytes % 9.4      Eosinophils % 1.3      Basophils % 1.0      Neutrophils Absolute 4.52      Immature Granulocytes Absolute, Automated 0.02      Lymphocytes Absolute 0.99 (*)     Monocytes Absolute 0.59      Eosinophils Absolute 0.08      Basophils Absolute 0.06     COMPREHENSIVE METABOLIC PANEL - Abnormal    Glucose 75      Sodium 135 (*)     Potassium 4.0      Chloride 100      Bicarbonate 22      Anion Gap 17      Urea Nitrogen 10      Creatinine 0.84      eGFR >90      Calcium 9.5      Albumin 4.3      Alkaline Phosphatase 39      Total Protein 7.2      AST 34      Bilirubin, Total 0.6      ALT 45     URINALYSIS WITH REFLEX CULTURE AND MICROSCOPIC - Abnormal    Color, Urine Yellow      Appearance, Urine Clear      Specific Gravity, Urine 1.021      pH, Urine 5.0      Protein, Urine NEGATIVE      Glucose, Urine Normal      Blood, Urine NEGATIVE      Ketones, Urine 150 (4+) (*)     Bilirubin, Urine NEGATIVE      Urobilinogen, Urine Normal      Nitrite, Urine NEGATIVE      Leukocyte Esterase, Urine 25 Nicolasa/uL (*)    MICROSCOPIC ONLY, URINE - Abnormal    WBC, Urine NONE      RBC, Urine NONE      Mucus, Urine FEW      Hyaline Casts, Urine OCCASIONAL (*)    LIPASE - Normal    Lipase 28      Narrative:     Venipuncture immediately after or during the administration of Metamizole may lead to falsely low results. Testing should be performed immediately prior to Metamizole dosing.   LACTATE - Normal    Lactate 1.0      Narrative:     Venipuncture immediately after or during the administration of Metamizole may lead to falsely low results. Testing should be performed immediately prior to Metamizole dosing.   URINE CULTURE   URINALYSIS WITH REFLEX CULTURE AND  MICROSCOPIC    Narrative:     The following orders were created for panel order Urinalysis with Reflex Culture and Microscopic.  Procedure                               Abnormality         Status                     ---------                               -----------         ------                     Urinalysis with Reflex Cu...[[id number]]  Abnormal            Final result               Extra Urine Gray Tube[[id number]]                             In process                   Please view results for these tests on the individual orders.   EXTRA URINE GRAY TUBE     Radiographs:  CT abdomen pelvis w IV contrast   Final Result   *Diverticulitis at the rectosigmoid junction is mildly diminished   in conspicuity compared with the prior study. No drainable fluid   collection. No free air. A colonoscopy should be performed after   inflammatory symptoms have resolved to ensure that there is no   underlying colonic malignancy.    *1.5 cm left adrenal adenoma.   *Hepatic steatosis.   *2 mm lingular pulmonary nodule.   *Coronary artery calcification, a marker for coronary   atherosclerotic disease.   Pulmonary nodule recommendation: No further investigation recommended   (Per Fleischner Society guidelines).   Signed by Jaquan Neely MD          Assessment   In brief, Mayo Edmond is a 64 y.o. male who presented to the emergency department for worsening left lower quadrant pain in the setting of diagnosed early diverticulitis a week ago, on antibiotics    Plan   IV, lab work, imaging    Differentials   Progression of diverticulitis  Perforation  Abscess    ED Course     Diagnoses as of 01/16/25 1346   Diverticulitis   Left lower quadrant abdominal pain       Visit Vitals  /75 (BP Location: Right arm, Patient Position: Lying)   Pulse 74   Temp 36.6 °C (97.9 °F) (Temporal)   Resp 17   Ht 1.829 m (6')   Wt 112 kg (248 lb)   SpO2 94%   BMI 33.63 kg/m²   Smoking Status Never   BSA 2.39 m²       Medications   sodium chloride  0.9% infusion (125 mL/hr intravenous Rate/Dose Verify 1/16/25 1341)   morphine injection 4 mg (4 mg intravenous Given 1/16/25 1115)   ondansetron (Zofran) injection 4 mg (4 mg intravenous Given 1/16/25 1119)   iohexol (OMNIPaque) 350 mg iodine/mL solution 90 mL (90 mL intravenous Given 1/16/25 1230)   ketorolac (Toradol) injection 15 mg (15 mg intravenous Given 1/16/25 1249)       Plan of care discussed, patient is stable appearing, given morphine, Zofran, lab work obtained and reviewed showing negative lactate, no leukocytosis.  Also given Toradol for pain, CAT scan without any perforation or drainable abscess, appears to be stable diverticulitis and appears to be improving, patient was advised to continue his current regimen his ciprofloxacin and Flagyl, will provide analgesics for his abdominal pain, given strict return precautions, advised follow-up with his primary care, also advised to follow-up with gastroenterology for outpatient colonoscopy upon resolution of symptoms, patient is agreeable to above plan and is stable for discharge home      Final Impression      1. Diverticulitis    2. Left lower quadrant abdominal pain          DISPOSITION  Disposition: Discharge to home  Patient condition is stable    Comment: Please note this report has been produced using speech recognition software and may contain errors related to that system including errors in grammar, punctuation, and spelling, as well as words and phrases that may be inappropriate.  If there are any questions or concerns please feel free to contact the dictating provider for clarification.    MORENO Arita APRN-CNP  01/16/25 3552

## 2025-01-17 LAB — BACTERIA UR CULT: NO GROWTH

## 2025-01-22 ENCOUNTER — APPOINTMENT (OUTPATIENT)
Dept: PRIMARY CARE | Facility: CLINIC | Age: 65
End: 2025-01-22
Payer: COMMERCIAL

## 2025-01-22 VITALS
RESPIRATION RATE: 20 BRPM | BODY MASS INDEX: 32.91 KG/M2 | SYSTOLIC BLOOD PRESSURE: 143 MMHG | DIASTOLIC BLOOD PRESSURE: 92 MMHG | WEIGHT: 243 LBS | HEIGHT: 72 IN | HEART RATE: 68 BPM | TEMPERATURE: 98.1 F

## 2025-01-22 DIAGNOSIS — I10 PRIMARY HYPERTENSION: ICD-10-CM

## 2025-01-22 DIAGNOSIS — E66.811 CLASS 1 OBESITY DUE TO EXCESS CALORIES WITHOUT SERIOUS COMORBIDITY WITH BODY MASS INDEX (BMI) OF 32.0 TO 32.9 IN ADULT: ICD-10-CM

## 2025-01-22 DIAGNOSIS — J45.20 MILD INTERMITTENT ASTHMA WITHOUT COMPLICATION (HHS-HCC): ICD-10-CM

## 2025-01-22 DIAGNOSIS — E27.9 ADRENAL NODULE: ICD-10-CM

## 2025-01-22 DIAGNOSIS — K57.33 DIVERTICULITIS LARGE INTESTINE W/O PERFORATION OR ABSCESS W/BLEEDING: Primary | ICD-10-CM

## 2025-01-22 DIAGNOSIS — E66.09 CLASS 1 OBESITY DUE TO EXCESS CALORIES WITHOUT SERIOUS COMORBIDITY WITH BODY MASS INDEX (BMI) OF 32.0 TO 32.9 IN ADULT: ICD-10-CM

## 2025-01-22 DIAGNOSIS — Z12.11 ENCOUNTER FOR SCREENING FOR MALIGNANT NEOPLASM OF COLON: ICD-10-CM

## 2025-01-22 DIAGNOSIS — K42.9 UMBILICAL HERNIA WITHOUT OBSTRUCTION AND WITHOUT GANGRENE: ICD-10-CM

## 2025-01-22 DIAGNOSIS — F32.A DEPRESSION, UNSPECIFIED DEPRESSION TYPE: ICD-10-CM

## 2025-01-22 PROCEDURE — 96127 BRIEF EMOTIONAL/BEHAV ASSMT: CPT | Performed by: FAMILY MEDICINE

## 2025-01-22 PROCEDURE — 99214 OFFICE O/P EST MOD 30 MIN: CPT | Performed by: FAMILY MEDICINE

## 2025-01-22 PROCEDURE — 3080F DIAST BP >= 90 MM HG: CPT | Performed by: FAMILY MEDICINE

## 2025-01-22 PROCEDURE — 3008F BODY MASS INDEX DOCD: CPT | Performed by: FAMILY MEDICINE

## 2025-01-22 PROCEDURE — 3077F SYST BP >= 140 MM HG: CPT | Performed by: FAMILY MEDICINE

## 2025-01-22 ASSESSMENT — ENCOUNTER SYMPTOMS
ABDOMINAL PAIN: 1
SHORTNESS OF BREATH: 0
BLOOD IN STOOL: 0
FATIGUE: 1
WHEEZING: 0
COUGH: 0
FEVER: 0
DIARRHEA: 0
NAUSEA: 0
CONSTIPATION: 0
VOMITING: 0

## 2025-01-22 NOTE — PROGRESS NOTES
Subjective   Patient ID: Mayo Edmond is a 64 y.o. male who presents for Follow-up (ER follow up. Pt went in on the 16th for worsening abdominal pain from the diverticulits. He had already finished with the abx that was given at the [facility] prior to ER visit. Pt was told to follow up with PCP.).    HPI     Review of Systems   Constitutional:  Positive for fatigue. Negative for fever.   Respiratory:  Negative for cough, shortness of breath and wheezing.    Cardiovascular:  Negative for chest pain.   Gastrointestinal:  Positive for abdominal pain. Negative for blood in stool, constipation, diarrhea, nausea and vomiting.        Pt seen at [facility] 1/9/25, Dx diverticulitis, completed cipro and flagyl, had ER visit 1/16/25 and repeat ct abd showed some improvement  Cont pain but improved   Psychiatric/Behavioral:  Negative for dysphoric mood and suicidal ideas. The patient is not nervous/anxious.        Objective   BP (!) 143/92   Pulse 68   Temp 36.7 °C (98.1 °F)   Resp 20   Ht 1.829 m (6')   Wt 110 kg (243 lb)   BMI 32.96 kg/m²     Physical Exam  Vitals and nursing note reviewed.   Constitutional:       General: He is not in acute distress.     Appearance: Normal appearance.   HENT:      Head: Normocephalic and atraumatic.   Cardiovascular:      Rate and Rhythm: Normal rate and regular rhythm.      Heart sounds: Normal heart sounds.   Pulmonary:      Effort: Pulmonary effort is normal.      Breath sounds: Normal breath sounds.   Abdominal:      General: Bowel sounds are normal.      Palpations: Abdomen is soft. There is no mass.      Tenderness: There is no abdominal tenderness. There is no right CVA tenderness, left CVA tenderness, guarding or rebound.      Comments: Pt exam today is improved from initial exam, today he is comfortable and pt abd exam shows no tenderness   Skin:     General: Skin is warm and dry.   Neurological:      Mental Status: He is alert.   Psychiatric:         Mood and  Affect: Mood normal.         Behavior: Behavior normal.         Assessment/Plan   Problem List Items Addressed This Visit             ICD-10-CM    Asthma J45.909     Pt is on no meds  Has been stable         Adrenal nodule E27.9     Found on ct scan  Will refer to endo for eval         Relevant Orders    Referral to Endocrinology    Depression F32.A     Pt is on no meds  Has been stable         Hypertension I10     Pt is not on medication at this time  Pt to record bp 3x/wk, different times of day   F/up for bp recheck and review of home bp in 1 month         Relevant Orders    Follow Up In [facility] - PCP - Established    Diverticulitis large intestine w/o perforation or abscess w/bleeding - Primary K57.33     Pt seen 1/9/25 started on cipro and flagyl, ct did not show abscess or perf  Pt seen at [facility] er 1/16/25 for diverticulitis, had ivf's pain meds and zofran  Ct showed some improvement,  left adrenal adenoma 1.5 cm, pulm nodule 2 mm not requiring further investigation per radiologist,hepatic steatosis, fat containing umbilical hernia. labs were ok,   Pt is referred to GI, gen surg, and endo for above findings         Relevant Orders    Colonoscopy Screening; Average Risk Patient    Referral to Gastroenterology    Umbilical hernia without obstruction and without gangrene K42.9    Relevant Orders    Referral to General Surgery    Class 1 obesity due to excess calories with body mass index (BMI) of 32.0 to 32.9 in adult E66.811, E66.09, Z68.32     Advise healthy diet and exercise  HO printed          Other Visit Diagnoses         Codes    Encounter for screening for malignant neoplasm of colon     Z12.11    Relevant Orders    Colonoscopy Screening; Average Risk Patient

## 2025-01-23 PROBLEM — E66.09 CLASS 1 OBESITY DUE TO EXCESS CALORIES WITH BODY MASS INDEX (BMI) OF 32.0 TO 32.9 IN ADULT: Status: ACTIVE | Noted: 2025-01-23

## 2025-01-23 PROBLEM — E66.811 CLASS 1 OBESITY DUE TO EXCESS CALORIES WITH BODY MASS INDEX (BMI) OF 32.0 TO 32.9 IN ADULT: Status: ACTIVE | Noted: 2025-01-23

## 2025-01-23 ASSESSMENT — ENCOUNTER SYMPTOMS
NERVOUS/ANXIOUS: 0
DYSPHORIC MOOD: 0

## 2025-01-23 ASSESSMENT — PATIENT HEALTH QUESTIONNAIRE - PHQ9
SUM OF ALL RESPONSES TO PHQ9 QUESTIONS 1 & 2: 0
1. LITTLE INTEREST OR PLEASURE IN DOING THINGS: NOT AT ALL
2. FEELING DOWN, DEPRESSED OR HOPELESS: NOT AT ALL

## 2025-01-23 NOTE — ASSESSMENT & PLAN NOTE
Pt is not on medication at this time  Pt to record bp 3x/wk, different times of day   F/up for bp recheck and review of home bp in 1 month   [place]

## 2025-01-23 NOTE — ASSESSMENT & PLAN NOTE
Pt seen 1/9/25 started on cipro and flagyl, ct did not show abscess or perf  Pt seen at [facility] er 1/16/25 for diverticulitis, had ivf's pain meds and zofran  Ct showed some improvement,  left adrenal adenoma 1.5 cm, pulm nodule 2 mm not requiring further investigation per radiologist,hepatic steatosis, fat containing umbilical hernia. labs were ok,   Pt is referred to GI, gen surg, and endo for above findings

## 2025-01-27 ENCOUNTER — APPOINTMENT (OUTPATIENT)
Dept: GASTROENTEROLOGY | Facility: EXTERNAL LOCATION | Age: 65
End: 2025-01-27
Payer: COMMERCIAL

## 2025-01-27 VITALS
HEART RATE: 75 BPM | BODY MASS INDEX: 31.69 KG/M2 | HEIGHT: 72 IN | SYSTOLIC BLOOD PRESSURE: 142 MMHG | RESPIRATION RATE: 14 BRPM | DIASTOLIC BLOOD PRESSURE: 96 MMHG | OXYGEN SATURATION: 96 % | WEIGHT: 234 LBS | TEMPERATURE: 97.3 F

## 2025-01-27 DIAGNOSIS — Z12.11 ENCOUNTER FOR SCREENING FOR MALIGNANT NEOPLASM OF COLON: ICD-10-CM

## 2025-01-27 DIAGNOSIS — K57.33 DIVERTICULITIS LARGE INTESTINE W/O PERFORATION OR ABSCESS W/BLEEDING: Primary | ICD-10-CM

## 2025-01-27 DIAGNOSIS — K57.33 DIVERTICULITIS LARGE INTESTINE W/O PERFORATION OR ABSCESS W/BLEEDING: ICD-10-CM

## 2025-01-27 ASSESSMENT — PAIN SCALES - GENERAL: PAINLEVEL_OUTOF10: 0 - NO PAIN

## 2025-02-19 ENCOUNTER — APPOINTMENT (OUTPATIENT)
Dept: GASTROENTEROLOGY | Facility: CLINIC | Age: 65
End: 2025-02-19
Payer: COMMERCIAL

## 2025-02-19 VITALS
BODY MASS INDEX: 33.03 KG/M2 | DIASTOLIC BLOOD PRESSURE: 88 MMHG | HEIGHT: 72 IN | SYSTOLIC BLOOD PRESSURE: 152 MMHG | OXYGEN SATURATION: 96 % | HEART RATE: 70 BPM | RESPIRATION RATE: 18 BRPM | WEIGHT: 243.9 LBS

## 2025-02-19 DIAGNOSIS — R10.32 LEFT LOWER QUADRANT ABDOMINAL PAIN: ICD-10-CM

## 2025-02-19 DIAGNOSIS — K92.1 HEMATOCHEZIA: ICD-10-CM

## 2025-02-19 DIAGNOSIS — K57.33 DIVERTICULITIS LARGE INTESTINE W/O PERFORATION OR ABSCESS W/BLEEDING: ICD-10-CM

## 2025-02-19 DIAGNOSIS — Z12.11 ENCOUNTER FOR SCREENING FOR MALIGNANT NEOPLASM OF COLON: Primary | ICD-10-CM

## 2025-02-19 PROCEDURE — 3077F SYST BP >= 140 MM HG: CPT

## 2025-02-19 PROCEDURE — 3008F BODY MASS INDEX DOCD: CPT

## 2025-02-19 PROCEDURE — 3079F DIAST BP 80-89 MM HG: CPT

## 2025-02-19 PROCEDURE — 1036F TOBACCO NON-USER: CPT

## 2025-02-19 PROCEDURE — 99214 OFFICE O/P EST MOD 30 MIN: CPT

## 2025-02-19 ASSESSMENT — ENCOUNTER SYMPTOMS
COUGH: 0
BLOOD IN STOOL: 1
NERVOUS/ANXIOUS: 0
BRUISES/BLEEDS EASILY: 0
RECTAL PAIN: 0
LIGHT-HEADEDNESS: 0
FLANK PAIN: 0
CHILLS: 0
ANAL BLEEDING: 0
DIARRHEA: 0
NAUSEA: 0
APPETITE CHANGE: 0
AGITATION: 0
ARTHRALGIAS: 0
ABDOMINAL PAIN: 1
ABDOMINAL DISTENTION: 0
PALPITATIONS: 0
CONFUSION: 0
CONSTIPATION: 1
CHOKING: 0
TROUBLE SWALLOWING: 0
TREMORS: 0
DIZZINESS: 0
WEAKNESS: 0
VOMITING: 0
COLOR CHANGE: 0
FEVER: 0
UNEXPECTED WEIGHT CHANGE: 0
JOINT SWELLING: 0
MYALGIAS: 0
SHORTNESS OF BREATH: 0
VOICE CHANGE: 0
SEIZURES: 0
HEMATURIA: 0

## 2025-02-19 NOTE — PROGRESS NOTES
Subjective   [facility] ED/colon consult   Patient ID: Mayo Edmond is a 64 y.o. male who presents for Diverticulitis (Colon aborted d/t active diverticulitis. Has appt with Dr. Hernandez on 3/05/25).        History of Present Illness:   Mayo Edmond is a 64 y.o. male with a PMH of chronic constipation, diverticulosis, diverticulitis, GERD, obesity, umbilical hernia BPH CAD, HTN whoo presents today presents today post ED visit 1/16/25 for worsening left lower quadrant pain and setting of diagnosed early diverticulitis 1/9/25 and colon consult. Patient had a colon scheduled prior but it was aborted d/t diverticulitis.   In the ED patient was given Toradol for pain, since finished his regimen of ciprofloxacin and Flagyl.  CT scan endings without perforation or drainable abscess and appears to be stable diverticulitis improving since previous CT scan.  At today's visit patient states he feels much better.  His large so patient at this time with hemorrhoids which she has taken nothing for in the past.  He states he has been having BRB his abdominal pain is still present intermittently.  He upper GI red flags including odynophagia, dyspnea, weight loss, appetite change.  He denies smoking and drinking. He states he did use to take chronic NSAID use, Aleve, for arthritis and he would take 2 tabs every morning and sometimes 2 tabs in the afternoon.  He stopped taking 1 month ago.  Previous CT showed hepatic steatosis.  He has changed his diet to chicken fish turkey and vegetables at least 64 ounces of water daily and he is active.  History of GI related disorders and colon cancers.  He has an appointment coming up for new patient visit with general surgery Dr Hernandez.    CT 1/16/25  IMPRESSION:  *Diverticulitis at the rectosigmoid junction is mildly diminished  in conspicuity compared with the prior study. No drainable fluid  collection. No free air. A colonoscopy should be performed after  inflammatory symptoms have resolved to  ensure that there is no  underlying colonic malignancy.   *1.5 cm left adrenal adenoma.  *Hepatic steatosis.  *2 mm lingular pulmonary nodule.  *Coronary artery calcification, a marker for coronary  atherosclerotic disease.  Pulmonary nodule recommendation: No further investigation recommended  (Per Fleischner Society guidelines).  Signed by Jaquan Neely MD    CT 1/9/25  IMPRESSION:  1.  Early diverticulitis.    Colonoscopy 11/17/22  Impression:            - One 4 mm polyp in the cecum, removed with a cold                          snare. Resected and retrieved.                         - Four 4 to 5 mm polyps in the transverse colon,                          removed with a cold snare. Resected and retrieved.                         - Two 4 to 6 mm polyps in the descending colon,                          removed with a cold snare. Resected and retrieved.                         - Diverticulosis in the entire examined colon.                         - Internal hemorrhoids.  Recommendation:        - Discharge patient to home.                         - Resume previous diet.                         - Continue present medications.                         - Await pathology results.                         - Repeat colonoscopy in 3 years for surveillance.                         - Return to referring physician as previously                          scheduled.                         - Patient has a contact number available for                          emergencies. The signs and symptoms of potential                          delayed complications were discussed with the patient.                          Return to normal activities tomorrow. Written                          discharge instructions were provided to the patient.    Review of Systems  ROS Negative unless otherwise stated above.    Past Medical/Surgical History  Past Medical History:   Diagnosis Date    Personal history of other diseases of the musculoskeletal  system and connective tissue     History of joint pain    Personal history of other diseases of the nervous system and sense organs 08/25/2022    History of drainage from eye    Personal history of other infectious and parasitic diseases 01/18/2021    History of tinea corporis    Unspecified injury of unspecified wrist, hand and finger(s), initial encounter 07/30/2018    Wrist injury      Past Surgical History:   Procedure Laterality Date    OTHER SURGICAL HISTORY  01/18/2021    Knee arthroscopy    OTHER SURGICAL HISTORY  01/18/2021    Tonsillectomy with adenoidectomy        Social History   reports that he has never smoked. He has never used smokeless tobacco. He reports current alcohol use of about 24.0 standard drinks of alcohol per week. He reports current drug use. Drug: Marijuana.     Family History  family history is not on file.     Allergies  Allergies   Allergen Reactions    Diltiazem Unknown and Itching       Medications  No current outpatient medications       Review of Systems   Constitutional:  Negative for appetite change, chills, fever and unexpected weight change.   HENT:  Negative for mouth sores, nosebleeds, trouble swallowing and voice change.    Respiratory:  Negative for cough, choking and shortness of breath.    Cardiovascular:  Negative for chest pain, palpitations and leg swelling.   Gastrointestinal:  Positive for abdominal pain, blood in stool and constipation. Negative for abdominal distention, anal bleeding, diarrhea, nausea, rectal pain and vomiting.   Genitourinary:  Negative for flank pain and hematuria.   Musculoskeletal:  Negative for arthralgias, joint swelling and myalgias.   Skin:  Negative for color change and rash.   Allergic/Immunologic: Negative for environmental allergies, food allergies and immunocompromised state.   Neurological:  Negative for dizziness, tremors, seizures, syncope, weakness and light-headedness.   Hematological:  Does not bruise/bleed easily.  "  Psychiatric/Behavioral:  Negative for agitation and confusion. The patient is not nervous/anxious.        Objective   Physical Exam  Vitals reviewed.   Constitutional:       Appearance: Normal appearance. He is normal weight.   HENT:      Head: Normocephalic and atraumatic.      Nose: Nose normal.      Mouth/Throat:      Mouth: Mucous membranes are moist.   Eyes:      Pupils: Pupils are equal, round, and reactive to light.   Cardiovascular:      Rate and Rhythm: Normal rate.   Pulmonary:      Effort: Pulmonary effort is normal.      Breath sounds: Normal breath sounds.   Abdominal:      General: Bowel sounds are normal.      Palpations: Abdomen is soft.      Tenderness: There is abdominal tenderness.      Hernia: A hernia is present.      Comments: LLQ/mid abd hernia    Musculoskeletal:         General: Normal range of motion.      Cervical back: Normal range of motion.   Skin:     General: Skin is warm and dry.      Capillary Refill: Capillary refill takes less than 2 seconds.   Neurological:      Mental Status: He is alert and oriented to person, place, and time.       /88 (BP Location: Right arm, Patient Position: Sitting, BP Cuff Size: Large adult)   Pulse 70   Resp 18   Ht 1.829 m (6')   Wt 111 kg (243 lb 14.4 oz)   SpO2 96%   BMI 33.08 kg/m²      Lab Results   Component Value Date    WBC 6.3 01/16/2025    HGB 15.4 01/16/2025    HCT 46.2 01/16/2025    MCV 88 01/16/2025     01/16/2025           No lab exists for component: \"LABALBU\"    No results found for: \"AFP\"  Lab Results   Component Value Date    TSH 0.45 11/30/2022         Assessment/Plan   Mayo Edmond is a 64 y.o. male with a PMH of chronic constipation, diverticulosis, diverticulitis, GERD, obesity, umbilical hernia BPH CAD, HTN whoo presents today presents today post ED visit 1/16/25 for worsening left lower quadrant pain and setting of diagnosed early diverticulitis 1/9/25 and colon consult. Patient had a colon scheduled prior " but it was aborted d/t diverticulitis. Patient scheduled for colonoscopy as his symptoms have improved and he has completed his antibiotic regimen.  Last CT findings show improvement of his diverticulitis.  Risks discussed with patient.  He also has a new patient general surgery visit in regards to his diverticulosis patient will follow-up with me post colonoscopy or sooner if problems arise.  Diagnoses and all orders for this visit:  Encounter for screening for malignant neoplasm of colon  -     Colonoscopy Screening; High Risk Patient; Future  Diverticulitis large intestine w/o perforation or abscess w/bleeding  Left lower quadrant abdominal pain  Hematochezia         Anuradha Abel APRN-CNP

## 2025-02-19 NOTE — PATIENT INSTRUCTIONS
Daily bowel regimen:  -MiraLAX 1 capful daily in 8 ounces of any liquid.  You can increase or decrease the dose as needed the goal is to have a daily BM and feel fully evacuated.  -Metamucil 1 teaspoon daily in 8 ounces of water (Premium Blend-green label)  -Footstool or squatty potty during bowel movements  -Adequate water throughout the day 64oz  -Walk 15 to 30 minutes daily to help promote GI motility    Colonoscopy here and I will see you after scope

## 2025-02-28 NOTE — PROGRESS NOTES
HPI    Mayo Edmond is a 64 y.o. male  with a hx of GERD, HTN, Asthma, BPH, CAD, and Umbilical hernia, who described LLQ abdominal and flank pain at office visit with Dr. Chong 1/9/25. CT a/p was ordered and completed the same day, and showed early diverticulitis. He was rx two oral antibiotics. Pain continued to worsen with decreased appetite, which led her to [place] [facility] [place]14[place]. CT scan showed stable diverticulitis and he was discharged the same day home with pain medications and told to continue oral antibiotics. He presents today to discuss.     He states he developed severe abdominal pain and blood in stool around end of 12/2024 for which he presented to his PCP.  CT A/P was subsequently ordered demonstrated sigmoid diverticulitis.  He was rx antibiotics and changed to a low fiber diet. Symptoms did not improve prompting presentation to ED.  Repeat CT A/P demonstrated improvement in diverticulitis. He was discharged from ED with instructions to complete antibiotic course. Overall patient reports that he has improved significantly.  He now describes an intermittent dull abdominal pain daily, rating pain 3/10, that also radiates into back. He has 1-4 bms daily. Just started metamucil fiber supplement. Denies hematochezia or melena.  But reports that he had some blood per rectum 1/2025 when abdominal pain was at its peak.  He is eating and drinking without difficulty. Normal appetite. Denies unexplained weight loss. He reports he has had 3 diverticular episodes, and last episode was over a year ago, tx with antibiotics and change of diet. He has never had to be hospitalized for diverticulitis. Denies dysuria, hematuria, fecaluria, or pneumaturia.  He has a colonoscopy scheduled for two weeks from now.    CT A/P 1/16/25  Impression:   *Diverticulitis at the rectosigmoid junction is mildly diminished in conspicuity compared with the prior study. No drainable fluid collection. No free air. A colonoscopy  should be performed after inflammatory symptoms have resolved to ensure that there is no underlying colonic malignancy.   *1.5 cm left adrenal adenoma.  *Hepatic steatosis.  *2 mm lingular pulmonary nodule.  *Coronary artery calcification, a marker for coronary  atherosclerotic disease. Pulmonary nodule recommendation: No further investigation recommended    CT A/P 1/9/25  Impression:   1. Early diverticulitis.     Colonoscopy 3/20/25 ([place]): Scheduled.     Colonoscopy 11/17/22 ([place])  Impression:     - One 4 mm polyp in the cecum, removed with a cold snare. Resected and retrieved. Path demonstrating TA.   - Four 4 to 5 mm polyps in the transverse colon, removed with a cold snare. Resected and retrieved. Path demonstrating fragments of TA.   - Two 4 to 6 mm polyps in the descending colon, removed with a cold snare. Resected and retrieved. Path demonstrating fragments of TA.   - Diverticulosis in the entire examined colon.  - Internal hemorrhoids.  - Repeat colonoscopy in 3 years for surveillance.     Non-smoker/No ETOH/Marijuana use  PMH: GERD, HTN, Asthma, BPH, CAD, Umbilical hernia, Arrhythmia,   PSH: No abdominal surgeries,   No family history of CRC or IBD  Employment:     Past Medical History:   Diagnosis Date    Personal history of other diseases of the musculoskeletal system and connective tissue     History of joint pain    Personal history of other diseases of the nervous system and sense organs 08/25/2022    History of drainage from eye    Personal history of other infectious and parasitic diseases 01/18/2021    History of tinea corporis    Unspecified injury of unspecified wrist, hand and finger(s), initial encounter 07/30/2018    Wrist injury       Past Surgical History:   Procedure Laterality Date    OTHER SURGICAL HISTORY  01/18/2021    Knee arthroscopy    OTHER SURGICAL HISTORY  01/18/2021    Tonsillectomy with adenoidectomy       Allergies   Allergen Reactions    Diltiazem Unknown and Itching        Review of Systems   Constitutional:  Negative for activity change, appetite change, chills, diaphoresis, fatigue, fever and unexpected weight change.   Respiratory:  Negative for cough, chest tightness and shortness of breath.    Cardiovascular:  Negative for chest pain, palpitations and leg swelling.   Gastrointestinal:  Positive for abdominal pain. Negative for anal bleeding, blood in stool, constipation, diarrhea, nausea, rectal pain and vomiting.   Genitourinary:  Negative for difficulty urinating, dysuria and hematuria.   Neurological:  Negative for dizziness, weakness and light-headedness.   All other systems reviewed and are negative.      No current outpatient medications on file prior to visit.     No current facility-administered medications on file prior to visit.       Physical Exam  Vitals reviewed. Exam conducted with a chaperone present.   Constitutional:       Appearance: Normal appearance.   HENT:      Head: Normocephalic.   Eyes:      Pupils: Pupils are equal, round, and reactive to light.   Cardiovascular:      Rate and Rhythm: Normal rate and regular rhythm.      Pulses: Normal pulses.      Heart sounds: Normal heart sounds. No murmur heard.  Pulmonary:      Effort: Pulmonary effort is normal. No respiratory distress.      Breath sounds: Normal breath sounds. No stridor. No wheezing, rhonchi or rales.   Chest:      Chest wall: No tenderness.   Abdominal:      General: There is no distension.      Palpations: Abdomen is soft. There is no mass.      Tenderness: There is no abdominal tenderness.      Hernia: No hernia is present.      Comments: Umbilical hernia without gangrene or obstruction.    Musculoskeletal:         General: No swelling or deformity. Normal range of motion.      Cervical back: Normal range of motion.      Right lower leg: No edema.      Left lower leg: No edema.   Skin:     General: Skin is warm and dry.      Capillary Refill: Capillary refill takes less than 2 seconds.    Neurological:      General: No focal deficit present.      Mental Status: He is alert and oriented to person, place, and time. Mental status is at baseline.   Psychiatric:         Mood and Affect: Mood normal.         Behavior: Behavior normal.         Thought Content: Thought content normal.         Judgment: Judgment normal.         Assessment and Plan:   #Sigmoid diverticulitis, uncomplicated  -  CT imaging studies from 1/2025 reviewed; very mild diverticulitis with improvement/near resolution on second study  -  Clinically doing well now  -  Agree with colonoscopy in 2 weeks  -  No surgical intervention indicated  -  Discussed importance of long-term high-fiber diet and/or fiber supplementation, adequate daily water hydration  -  Follow-up with me BROOK Hernandez MD   3/5/2025  2:32 PM

## 2025-03-04 ENCOUNTER — ANESTHESIA EVENT (OUTPATIENT)
Dept: GASTROENTEROLOGY | Facility: EXTERNAL LOCATION | Age: 65
End: 2025-03-04

## 2025-03-05 ENCOUNTER — OFFICE VISIT (OUTPATIENT)
Dept: SURGERY | Facility: CLINIC | Age: 65
End: 2025-03-05
Payer: COMMERCIAL

## 2025-03-05 VITALS
BODY MASS INDEX: 33.05 KG/M2 | HEIGHT: 72 IN | SYSTOLIC BLOOD PRESSURE: 143 MMHG | HEART RATE: 78 BPM | TEMPERATURE: 98.3 F | WEIGHT: 244 LBS | DIASTOLIC BLOOD PRESSURE: 89 MMHG

## 2025-03-05 DIAGNOSIS — K57.33 DIVERTICULITIS LARGE INTESTINE W/O PERFORATION OR ABSCESS W/BLEEDING: ICD-10-CM

## 2025-03-05 PROCEDURE — 3079F DIAST BP 80-89 MM HG: CPT | Performed by: STUDENT IN AN ORGANIZED HEALTH CARE EDUCATION/TRAINING PROGRAM

## 2025-03-05 PROCEDURE — 3008F BODY MASS INDEX DOCD: CPT | Performed by: STUDENT IN AN ORGANIZED HEALTH CARE EDUCATION/TRAINING PROGRAM

## 2025-03-05 PROCEDURE — 3077F SYST BP >= 140 MM HG: CPT | Performed by: STUDENT IN AN ORGANIZED HEALTH CARE EDUCATION/TRAINING PROGRAM

## 2025-03-05 PROCEDURE — 99214 OFFICE O/P EST MOD 30 MIN: CPT | Performed by: STUDENT IN AN ORGANIZED HEALTH CARE EDUCATION/TRAINING PROGRAM

## 2025-03-05 PROCEDURE — 1036F TOBACCO NON-USER: CPT | Performed by: STUDENT IN AN ORGANIZED HEALTH CARE EDUCATION/TRAINING PROGRAM

## 2025-03-05 PROCEDURE — 99204 OFFICE O/P NEW MOD 45 MIN: CPT | Performed by: STUDENT IN AN ORGANIZED HEALTH CARE EDUCATION/TRAINING PROGRAM

## 2025-03-05 RX ORDER — ACETAMINOPHEN 500 MG
TABLET ORAL EVERY 6 HOURS PRN
COMMUNITY

## 2025-03-05 ASSESSMENT — ENCOUNTER SYMPTOMS
DIARRHEA: 0
ABDOMINAL PAIN: 1
VOMITING: 0
RECTAL PAIN: 0
CHILLS: 0
APPETITE CHANGE: 0
NAUSEA: 0
WEAKNESS: 0
LIGHT-HEADEDNESS: 0
FATIGUE: 0
FEVER: 0
BLOOD IN STOOL: 0
COUGH: 0
DYSURIA: 0
ANAL BLEEDING: 0
DIZZINESS: 0
ACTIVITY CHANGE: 0
DIFFICULTY URINATING: 0
HEMATURIA: 0
UNEXPECTED WEIGHT CHANGE: 0
DIAPHORESIS: 0
CONSTIPATION: 0
CHEST TIGHTNESS: 0
SHORTNESS OF BREATH: 0
PALPITATIONS: 0

## 2025-03-20 ENCOUNTER — ANESTHESIA (OUTPATIENT)
Dept: GASTROENTEROLOGY | Facility: EXTERNAL LOCATION | Age: 65
End: 2025-03-20

## 2025-03-20 ENCOUNTER — APPOINTMENT (OUTPATIENT)
Dept: GASTROENTEROLOGY | Facility: EXTERNAL LOCATION | Age: 65
End: 2025-03-20
Payer: COMMERCIAL

## 2025-03-20 VITALS
DIASTOLIC BLOOD PRESSURE: 90 MMHG | HEART RATE: 68 BPM | BODY MASS INDEX: 31.56 KG/M2 | WEIGHT: 233 LBS | SYSTOLIC BLOOD PRESSURE: 125 MMHG | TEMPERATURE: 97.3 F | RESPIRATION RATE: 12 BRPM | OXYGEN SATURATION: 98 % | HEIGHT: 72 IN

## 2025-03-20 DIAGNOSIS — Z86.0100 HISTORY OF COLON POLYPS: Primary | ICD-10-CM

## 2025-03-20 DIAGNOSIS — Z12.11 ENCOUNTER FOR SCREENING FOR MALIGNANT NEOPLASM OF COLON: ICD-10-CM

## 2025-03-20 PROCEDURE — 45385 COLONOSCOPY W/LESION REMOVAL: CPT | Performed by: STUDENT IN AN ORGANIZED HEALTH CARE EDUCATION/TRAINING PROGRAM

## 2025-03-20 RX ORDER — SODIUM CHLORIDE 9 MG/ML
INJECTION, SOLUTION INTRAVENOUS CONTINUOUS PRN
Status: DISCONTINUED | OUTPATIENT
Start: 2025-03-20 | End: 2025-03-20

## 2025-03-20 RX ORDER — PROPOFOL 10 MG/ML
INJECTION, EMULSION INTRAVENOUS AS NEEDED
Status: DISCONTINUED | OUTPATIENT
Start: 2025-03-20 | End: 2025-03-20

## 2025-03-20 RX ORDER — LIDOCAINE HYDROCHLORIDE 20 MG/ML
INJECTION, SOLUTION INFILTRATION; PERINEURAL AS NEEDED
Status: DISCONTINUED | OUTPATIENT
Start: 2025-03-20 | End: 2025-03-20

## 2025-03-20 RX ADMIN — SODIUM CHLORIDE: 9 INJECTION, SOLUTION INTRAVENOUS at 08:00

## 2025-03-20 RX ADMIN — PROPOFOL 50 MG: 10 INJECTION, EMULSION INTRAVENOUS at 08:21

## 2025-03-20 RX ADMIN — PROPOFOL 100 MG: 10 INJECTION, EMULSION INTRAVENOUS at 08:03

## 2025-03-20 RX ADMIN — PROPOFOL 50 MG: 10 INJECTION, EMULSION INTRAVENOUS at 08:05

## 2025-03-20 RX ADMIN — LIDOCAINE HYDROCHLORIDE 50 MG: 20 INJECTION, SOLUTION INFILTRATION; PERINEURAL at 08:03

## 2025-03-20 RX ADMIN — PROPOFOL 50 MG: 10 INJECTION, EMULSION INTRAVENOUS at 08:10

## 2025-03-20 RX ADMIN — PROPOFOL 50 MG: 10 INJECTION, EMULSION INTRAVENOUS at 08:14

## 2025-03-20 RX ADMIN — PROPOFOL 50 MG: 10 INJECTION, EMULSION INTRAVENOUS at 08:07

## 2025-03-20 SDOH — HEALTH STABILITY: MENTAL HEALTH: CURRENT SMOKER: 0

## 2025-03-20 ASSESSMENT — PAIN - FUNCTIONAL ASSESSMENT
PAIN_FUNCTIONAL_ASSESSMENT: 0-10

## 2025-03-20 ASSESSMENT — COLUMBIA-SUICIDE SEVERITY RATING SCALE - C-SSRS
2. HAVE YOU ACTUALLY HAD ANY THOUGHTS OF KILLING YOURSELF?: NO
6. HAVE YOU EVER DONE ANYTHING, STARTED TO DO ANYTHING, OR PREPARED TO DO ANYTHING TO END YOUR LIFE?: NO
1. IN THE PAST MONTH, HAVE YOU WISHED YOU WERE DEAD OR WISHED YOU COULD GO TO SLEEP AND NOT WAKE UP?: NO

## 2025-03-20 ASSESSMENT — PAIN SCALES - GENERAL
PAINLEVEL_OUTOF10: 0 - NO PAIN
PAIN_LEVEL: 0
PAINLEVEL_OUTOF10: 0 - NO PAIN

## 2025-03-20 NOTE — H&P
Outpatient NR Procedure H&P    Patient Profile  Name Mayo Edmond  Date of Birth 1960  MRN [id number]  PCP Serenity Chong        Diagnosis: History of polyps, follow up of diverticulitis.  Procedure(s):  Colonoscopy    Allergies  Allergies   Allergen Reactions    Diltiazem Unknown and Itching       Past Medical History   Past Medical History:   Diagnosis Date    Diverticulitis     Personal history of other diseases of the musculoskeletal system and connective tissue     History of joint pain    Personal history of other diseases of the nervous system and sense organs 08/25/2022    History of drainage from eye    Personal history of other infectious and parasitic diseases 01/18/2021    History of tinea corporis    Unspecified injury of unspecified wrist, hand and finger(s), initial encounter 07/30/2018    Wrist injury       Medication Reviewed - yes  Prior to Admission medications    Medication Sig Start Date End Date Taking? Authorizing Provider   acetaminophen (Tylenol) 500 mg tablet Take by mouth every 6 hours if needed for mild pain (1 - 3).    Historical Provider, MD       Physical Exam  Vitals:    03/20/25 0730   BP: 119/88   Pulse: 72   Resp: 10   Temp: 36.8 °C (98.2 °F)   SpO2: 98%      Weight   Vitals:    03/20/25 0730   Weight: 106 kg (233 lb)     BMI Body mass index is 31.6 kg/m².    General: A&Ox3, NAD.  HEENT: AT/NC.   CV: RRR. No murmur.  Resp: CTA bilaterally. No wheezing, rhonchi or rales.   GI: Soft, NT/ND.  Extrem: No edema. Pulses intact.  Skin: No Jaundice.   Neuro: No focal deficits.   Psych: Normal mood and affect.        Oropharyngeal Classification II (hard and soft palate, upper portion of tonsils and uvula visible)  ASA PS Classification 2  Sedation Plan: Deep Sedation.  Procedure Plan - pre-procedural (re)assesment completed by physician:  discharge/transfer patient when discharge criteria met    Tristin Leigh DO  3/20/2025 8:01 AM

## 2025-03-20 NOTE — DISCHARGE INSTRUCTIONS

## 2025-03-20 NOTE — ANESTHESIA POSTPROCEDURE EVALUATION
Patient: Mayo Edmond    Procedure Summary       Date: 03/20/25 Room / Location: [facility]    Anesthesia Start: 0800 Anesthesia Stop:     Procedure: COLONOSCOPY Diagnosis: History of colon polyps    Scheduled Providers: Tristin Leigh DO; Tara Palomares RN Responsible Provider: ROSELIA Macdonald    Anesthesia Type: MAC ASA Status: 2            Anesthesia Type: MAC    Vitals Value Taken Time   /74 03/20/25 0827   Temp 36.3 °C (97.3 °F) 03/20/25 0827   Pulse 81 03/20/25 0827   Resp 19 03/20/25 0827   SpO2 97 % 03/20/25 0827       Anesthesia Post Evaluation    Patient location during evaluation: bedside  Patient participation: complete - patient cannot participate  Level of consciousness: awake and responsive to verbal stimuli  Pain score: 0  Pain management: adequate  Airway patency: patent  Cardiovascular status: acceptable and hemodynamically stable  Respiratory status: acceptable  Hydration status: acceptable  Postoperative Nausea and Vomiting: none    No notable events documented.

## 2025-03-20 NOTE — ANESTHESIA PREPROCEDURE EVALUATION
Patient: Mayo Edmond    Procedure Information       Date/Time: 03/20/25 0800    Scheduled providers: Tristin Leigh DO; Tara Palomares RN    Procedure: COLONOSCOPY    Location: [facility]            Relevant Problems   Cardiac   (+) CAD (coronary artery disease)   (+) HLD (hyperlipidemia)   (+) Hypertension      Pulmonary   (+) Asthma      Neuro   (+) Depression      GI   (+) GERD (gastroesophageal reflux disease)      /Renal   (+) BPH (benign prostatic hyperplasia)      Endocrine   (+) Class 1 obesity due to excess calories with body mass index (BMI) of 32.0 to 32.9 in adult      Musculoskeletal   (+) Localized osteoarthrosis      HEENT   (+) Hearing loss       Clinical information reviewed:   Tobacco  Allergies    Med Hx  Surg Hx   Fam Hx  Soc Hx        NPO Detail:  NPO/Void Status  Date of Last Liquid: 03/20/25  Time of Last Liquid: 0300  Date of Last Solid: 03/18/25  Last Intake Type: GI prep         Physical Exam    Airway  Mallampati: II  TM distance: >3 FB  Neck ROM: full     Cardiovascular - normal exam     Dental - normal exam     Pulmonary - normal exam  Breath sounds clear to auscultation     Abdominal          Anesthesia Plan    History of general anesthesia?: yes  History of complications of general anesthesia?: no    ASA 2     MAC     The patient is not a current smoker.    intravenous induction   Anesthetic plan and risks discussed with patient.    Plan discussed with CRNA.

## 2025-03-30 LAB
LABORATORY COMMENT REPORT: NORMAL
PATH REPORT.FINAL DX SPEC: NORMAL
PATH REPORT.GROSS SPEC: NORMAL
PATH REPORT.TOTAL CANCER: NORMAL

## 2025-04-03 ENCOUNTER — APPOINTMENT (OUTPATIENT)
Dept: GASTROENTEROLOGY | Facility: CLINIC | Age: 65
End: 2025-04-03
Payer: COMMERCIAL

## 2025-04-10 PROBLEM — R00.2 PALPITATIONS: Status: ACTIVE | Noted: 2022-11-30

## 2025-04-10 PROBLEM — H57.89 DISCHARGE OF EYE: Status: ACTIVE | Noted: 2025-04-10

## 2025-04-10 PROBLEM — R07.89 CHEST DISCOMFORT: Status: ACTIVE | Noted: 2022-11-30

## 2025-04-10 PROBLEM — R69 DISEASE SUSPECTED: Status: ACTIVE | Noted: 2025-04-10

## 2025-04-10 PROBLEM — B35.4 TINEA CORPORIS: Status: ACTIVE | Noted: 2025-04-10

## 2025-04-10 PROBLEM — M25.50 ARTHRALGIA: Status: ACTIVE | Noted: 2025-04-10

## 2025-04-10 PROBLEM — Z20.822 CONTACT WITH AND (SUSPECTED) EXPOSURE TO COVID-19: Status: ACTIVE | Noted: 2022-11-30

## 2025-04-11 ENCOUNTER — APPOINTMENT (OUTPATIENT)
Dept: GASTROENTEROLOGY | Facility: CLINIC | Age: 65
End: 2025-04-11
Payer: COMMERCIAL

## 2025-04-11 VITALS
HEIGHT: 72 IN | TEMPERATURE: 96.4 F | OXYGEN SATURATION: 94 % | BODY MASS INDEX: 31.56 KG/M2 | HEART RATE: 74 BPM | WEIGHT: 233 LBS | DIASTOLIC BLOOD PRESSURE: 82 MMHG | SYSTOLIC BLOOD PRESSURE: 131 MMHG

## 2025-04-11 DIAGNOSIS — K59.00 CONSTIPATION, UNSPECIFIED CONSTIPATION TYPE: ICD-10-CM

## 2025-04-11 DIAGNOSIS — Z09 FOLLOW-UP EXAM AFTER TREATMENT: Primary | ICD-10-CM

## 2025-04-11 DIAGNOSIS — K57.90 DIVERTICULOSIS: ICD-10-CM

## 2025-04-11 PROCEDURE — 3008F BODY MASS INDEX DOCD: CPT

## 2025-04-11 PROCEDURE — 99214 OFFICE O/P EST MOD 30 MIN: CPT

## 2025-04-11 PROCEDURE — 3075F SYST BP GE 130 - 139MM HG: CPT

## 2025-04-11 PROCEDURE — 3079F DIAST BP 80-89 MM HG: CPT

## 2025-04-11 PROCEDURE — 1036F TOBACCO NON-USER: CPT

## 2025-04-11 ASSESSMENT — ENCOUNTER SYMPTOMS
TREMORS: 0
COUGH: 0
SEIZURES: 0
CHILLS: 0
ABDOMINAL PAIN: 0
BRUISES/BLEEDS EASILY: 0
ANAL BLEEDING: 0
COLOR CHANGE: 0
TROUBLE SWALLOWING: 0
WEAKNESS: 0
FLANK PAIN: 0
CHOKING: 0
APPETITE CHANGE: 0
MYALGIAS: 0
DIARRHEA: 0
SHORTNESS OF BREATH: 0
CONSTIPATION: 1
PALPITATIONS: 0
CONFUSION: 0
VOMITING: 0
LIGHT-HEADEDNESS: 0
BLOOD IN STOOL: 0
NAUSEA: 0
UNEXPECTED WEIGHT CHANGE: 0
NERVOUS/ANXIOUS: 0
FEVER: 0
AGITATION: 0
JOINT SWELLING: 0
ARTHRALGIAS: 0
ABDOMINAL DISTENTION: 0
HEMATURIA: 0
VOICE CHANGE: 0
RECTAL PAIN: 0
DIZZINESS: 0

## 2025-04-11 NOTE — PATIENT INSTRUCTIONS
Daily bowel regimen:  -MiraLAX 1 capful daily in 8 ounces of any liquid.  You can increase or decrease the dose as needed the goal is to have a daily BM and feel fully evacuated.  -Metamucil 1 teaspoon daily in 8 ounces of water  -Footstool or squatty potty during bowel movements  -Adequate water throughout the day 64 oz  -Walk 15 to 30 minutes daily to help promote GI motility  -Follow-up in 6 weeks

## 2025-04-11 NOTE — PROGRESS NOTES
Subjective   Colonoscopy FUV  Patient ID: Mayo Edmond is a 64 y.o. male who presents for Follow-up (Patient presents for follow up colonoscopy. Patient saw Dr. Hernandez. He doesn't want to proceed with surgery at this time. /).        History of Present Illness:   Mayo Edmond is a 64 y.o. male with a PMH of chronic constipation, diverticulosis, diverticulitis, GERD, obesity, umbilical hernia BPH CAD, HTN who presents today for colonoscopy FUV.  Colonoscopy showing pathology of resected colon polyps proven to be adenomatous with no concern for dysplasia or cancer.  Repeat colonoscopy in 5 years, internal hemorrhoids noted.  Today patient states he is feeling much better he just has minimal intermittent abdominal pain, gets better with bowel movement.  He had his appointment with surgery physician Dr. Hernandez on 3/5 who states no intervention at this time.  He states he is still dealing with constipation and denies hematochezia, melena, rectal pain.  He denies all upper GI symptoms and complaints including odynophagia, dysphagia, altered appetite and unintentional weight loss.  He is taking Metamucil twice daily for his bowel movements with suboptimal response.  He has increased his fiber intake naturally from food.    LOV 2/19/2025  Mayo Edmond is a 64 y.o. male with a PMH of chronic constipation, diverticulosis, diverticulitis, GERD, obesity, umbilical hernia BPH CAD, HTN whoo presents today presents today post ED visit 1/16/25 for worsening left lower quadrant pain and setting of diagnosed early diverticulitis 1/9/25 and colon consult. Patient had a colon scheduled prior but it was aborted d/t diverticulitis.   In the ED patient was given Toradol for pain, since finished his regimen of ciprofloxacin and Flagyl.  CT scan endings without perforation or drainable abscess and appears to be stable diverticulitis improving since previous CT scan.  At today's visit patient states he feels much better.  His large so  patient at this time with hemorrhoids which she has taken nothing for in the past.  He states he has been having BRB his abdominal pain is still present intermittently.  He upper GI red flags including odynophagia, dyspnea, weight loss, appetite change.  He denies smoking and drinking. He states he did use to take chronic NSAID use, Aleve, for arthritis and he would take 2 tabs every morning and sometimes 2 tabs in the afternoon.  He stopped taking 1 month ago.  Previous CT showed hepatic steatosis.  He has changed his diet to chicken fish turkey and vegetables at least 64 ounces of water daily and he is active.  History of GI related disorders and colon cancers.  He has an appointment coming up for new patient visit with general surgery Dr Hernandez.  Patient scheduled for colonoscopy as his symptoms have improved and he has completed his antibiotic regimen.  Last CT findings show improvement of his diverticulitis.  Risks discussed with patient.  He also has a new patient general surgery visit in regards to his diverticulosis patient will follow-up with me post colonoscopy or sooner if problems arise.     CT 1/16/25  IMPRESSION:  *Diverticulitis at the rectosigmoid junction is mildly diminished  in conspicuity compared with the prior study. No drainable fluid  collection. No free air. A colonoscopy should be performed after  inflammatory symptoms have resolved to ensure that there is no  underlying colonic malignancy.   *1.5 cm left adrenal adenoma.  *Hepatic steatosis.  *2 mm lingular pulmonary nodule.  *Coronary artery calcification, a marker for coronary  atherosclerotic disease.  Pulmonary nodule recommendation: No further investigation recommended  (Per Fleischner Society guidelines).  Signed by Jaquan Neely MD     CT 1/9/25  IMPRESSION:  1.  Early diverticulitis.     Colonoscopy 11/17/22  Impression:     - One 4 mm polyp in the cecum, removed with a cold                          snare. Resected and retrieved.                          - Four 4 to 5 mm polyps in the transverse colon,                          removed with a cold snare. Resected and retrieved.                         - Two 4 to 6 mm polyps in the descending colon,                          removed with a cold snare. Resected and retrieved.                         - Diverticulosis in the entire examined colon.                         - Internal hemorrhoids.  Recommendation:        - Discharge patient to home.                         - Resume previous diet.                         - Continue present medications.                         - Await pathology results.                         - Repeat colonoscopy in 3 years for surveillance.                         - Return to referring physician as previously                          scheduled.                         - Patient has a contact number available for                          emergencies. The signs and symptoms of potential                          delayed complications were discussed with the patient.                          Return to normal activities tomorrow. Written                          discharge instructions were provided to the patient.     Colonoscopy 3/20/2025  Findings  The terminal ileum appeared normal.  Two sessile polyps measuring smaller than 5 mm in the ascending colon; performed cold snare with complete en bloc removal and retrieved specimen  Small and large, scattered diverticula in the ascending colon, descending colon and sigmoid colon  Internal hemorrhoids observed during retroflexion     Recommendation  Await pathology results   Repeat colonoscopy in 5 years, due: 3/19/2030     Surgical Pathology  FINAL DIAGNOSIS   A. COLON, ASCENDING, POLYPS X 2, POLYPECTOMY:  TUBULAR ADENOMA(S), FRAGMENTED.  COLONIC MUCOSA WITH NO SIGNIFICANT HISTOPATHOLOGIC ABNORMALITY.       Review of Systems  ROS Negative unless otherwise stated above.    Past Medical/Surgical History  Past Medical History:    Diagnosis Date    Diverticulitis     Personal history of other diseases of the musculoskeletal system and connective tissue     History of joint pain    Personal history of other diseases of the nervous system and sense organs 08/25/2022    History of drainage from eye    Personal history of other infectious and parasitic diseases 01/18/2021    History of tinea corporis    Unspecified injury of unspecified wrist, hand and finger(s), initial encounter 07/30/2018    Wrist injury      Past Surgical History:   Procedure Laterality Date    OTHER SURGICAL HISTORY  01/18/2021    Knee arthroscopy    OTHER SURGICAL HISTORY  01/18/2021    Tonsillectomy with adenoidectomy        Social History   reports that he has never smoked. He has never used smokeless tobacco. He reports that he does not currently use alcohol after a past usage of about 24.0 standard drinks of alcohol per week. He reports current drug use. Drug: Marijuana.     Family History  family history includes Colon polyps in his father.     Allergies  Allergies   Allergen Reactions    Diltiazem Unknown and Itching       Medications  Current Outpatient Medications   Medication Instructions    acetaminophen (Tylenol) 500 mg tablet Every 6 hours PRN          Review of Systems   Constitutional:  Negative for appetite change, chills, fever and unexpected weight change.   HENT:  Negative for mouth sores, nosebleeds, trouble swallowing and voice change.    Respiratory:  Negative for cough, choking and shortness of breath.    Cardiovascular:  Negative for chest pain, palpitations and leg swelling.   Gastrointestinal:  Positive for constipation. Negative for abdominal distention, abdominal pain, anal bleeding, blood in stool, diarrhea, nausea, rectal pain and vomiting.   Genitourinary:  Negative for flank pain and hematuria.   Musculoskeletal:  Negative for arthralgias, joint swelling and myalgias.   Skin:  Negative for color change and rash.   Allergic/Immunologic:  "Negative for environmental allergies, food allergies and immunocompromised state.   Neurological:  Negative for dizziness, tremors, seizures, syncope, weakness and light-headedness.   Hematological:  Does not bruise/bleed easily.   Psychiatric/Behavioral:  Negative for agitation and confusion. The patient is not nervous/anxious.        Objective   Physical Exam  Vitals reviewed.   Constitutional:       Appearance: Normal appearance. He is normal weight.   HENT:      Head: Normocephalic and atraumatic.      Nose: Nose normal.      Mouth/Throat:      Mouth: Mucous membranes are moist.   Eyes:      Pupils: Pupils are equal, round, and reactive to light.   Cardiovascular:      Rate and Rhythm: Normal rate.   Pulmonary:      Effort: Pulmonary effort is normal.      Breath sounds: Normal breath sounds.   Abdominal:      General: Bowel sounds are normal.      Palpations: Abdomen is soft.   Musculoskeletal:         General: Normal range of motion.      Cervical back: Normal range of motion.   Skin:     General: Skin is warm and dry.      Capillary Refill: Capillary refill takes less than 2 seconds.   Neurological:      Mental Status: He is alert and oriented to person, place, and time.       /82   Pulse 74   Temp 35.8 °C (96.4 °F) (Temporal)   Ht 1.829 m (6')   Wt 106 kg (233 lb)   SpO2 94%   BMI 31.60 kg/m²      Lab Results   Component Value Date    WBC 6.3 01/16/2025    HGB 15.4 01/16/2025    HCT 46.2 01/16/2025    MCV 88 01/16/2025     01/16/2025           No lab exists for component: \"LABALBU\"    No results found for: \"AFP\"  Lab Results   Component Value Date    TSH 0.45 11/30/2022         Assessment/Plan   Mayo Edmond is a 64 y.o. male with a PMH of chronic constipation, diverticulosis, diverticulitis, GERD, obesity, umbilical hernia BPH CAD, HTN who presents today for colonoscopy FUV.  Recommend daily bowel regimen of Metamucil and MiraLAX to help with Patient's constipation and increase water " intake to at least 64 ounces while continuing to have natural sources of fiber in his diet.  Recommended increase exercise or walking daily.  Patient will follow clinic as needed.  No red flags upon this visit.    Diagnoses and all orders for this visit:  Follow-up exam after treatment  Constipation, unspecified constipation type  Diverticulosis      Anuradha Abel APRN-CNP